# Patient Record
Sex: FEMALE | Race: WHITE | NOT HISPANIC OR LATINO
[De-identification: names, ages, dates, MRNs, and addresses within clinical notes are randomized per-mention and may not be internally consistent; named-entity substitution may affect disease eponyms.]

---

## 2019-07-23 PROBLEM — Z00.00 ENCOUNTER FOR PREVENTIVE HEALTH EXAMINATION: Status: ACTIVE | Noted: 2019-07-23

## 2019-07-24 ENCOUNTER — APPOINTMENT (OUTPATIENT)
Dept: OTOLARYNGOLOGY | Facility: CLINIC | Age: 64
End: 2019-07-24
Payer: COMMERCIAL

## 2019-07-24 VITALS
BODY MASS INDEX: 30.12 KG/M2 | HEART RATE: 101 BPM | HEIGHT: 63 IN | SYSTOLIC BLOOD PRESSURE: 119 MMHG | WEIGHT: 170 LBS | DIASTOLIC BLOOD PRESSURE: 83 MMHG

## 2019-07-24 DIAGNOSIS — Z83.3 FAMILY HISTORY OF DIABETES MELLITUS: ICD-10-CM

## 2019-07-24 DIAGNOSIS — Z78.9 OTHER SPECIFIED HEALTH STATUS: ICD-10-CM

## 2019-07-24 DIAGNOSIS — Z82.49 FAMILY HISTORY OF ISCHEMIC HEART DISEASE AND OTHER DISEASES OF THE CIRCULATORY SYSTEM: ICD-10-CM

## 2019-07-24 PROCEDURE — 99213 OFFICE O/P EST LOW 20 MIN: CPT | Mod: 25

## 2019-07-24 PROCEDURE — 31575 DIAGNOSTIC LARYNGOSCOPY: CPT

## 2019-07-24 NOTE — ASSESSMENT
[FreeTextEntry1] : Severe coughing fits for 6 weeks. During endoscopy she had such a fit and significant laryngeal sensitivity. While she was recovering it sounded as if she had wheezing and could not catch her breath. I strongly recommended pulmonary evaluation with Dr. Ferreira. Patient will call for an appointment. She also had evidence of reflux and I recommended dietary and behavioral modification and Zantac at night. I strongly recommended she not eat chocolate when she is symptomatic\par \par Snoring, possible obstructive sleep apnea. She will followup in one month and at that point we may consider home sleep study if her coughing symptoms have resolved

## 2019-07-24 NOTE — HISTORY OF PRESENT ILLNESS
[de-identified] : Patient presents with a 6 week history of dry cough and a globus sensation at the sternal notch. No preceding upper respiratory tract infections. She has a history of allergies to cats, but has not had recent exposure. Never saw a pulmonologist in the past. No known history of reflux. It improves when she takes a piece of chocolate which "coats the area ".  She reports a history of bronchitis on a yearly basis.\par \par Also reports many year history of snoring, no witnessed apneas, gasping awake, headaches, or daytime somnolence. He previously study was normal 20 years ago.

## 2019-08-21 ENCOUNTER — APPOINTMENT (OUTPATIENT)
Dept: OTOLARYNGOLOGY | Facility: CLINIC | Age: 64
End: 2019-08-21

## 2019-09-25 ENCOUNTER — APPOINTMENT (OUTPATIENT)
Dept: OTOLARYNGOLOGY | Facility: CLINIC | Age: 64
End: 2019-09-25
Payer: COMMERCIAL

## 2019-09-25 VITALS
BODY MASS INDEX: 30.12 KG/M2 | SYSTOLIC BLOOD PRESSURE: 121 MMHG | WEIGHT: 170 LBS | DIASTOLIC BLOOD PRESSURE: 78 MMHG | HEIGHT: 63 IN | HEART RATE: 75 BPM

## 2019-09-25 DIAGNOSIS — R06.83 SNORING: ICD-10-CM

## 2019-09-25 DIAGNOSIS — R05 COUGH: ICD-10-CM

## 2019-09-25 PROCEDURE — 99213 OFFICE O/P EST LOW 20 MIN: CPT

## 2019-09-25 NOTE — HISTORY OF PRESENT ILLNESS
[de-identified] : Patient seen July 24 with a 6 week history of dry cough and a globus sensation at the sternal notch. No preceding upper respiratory tract infections. She has a history of allergies to cats, but has not had recent exposure. Never saw a pulmonologist in the past. No known history of reflux. It improved when she ate a piece of chocolate which "coats the area ".  She reports a history of bronchitis on a yearly basis.  Had evidence of laryngopharyngeal reflux on endoscopy, and severe wheezing associated with a laryngeal sensitivity during scope I recommended pulmonary evaluation but she deferred. Recommended dietary modification and Zantac nightly. She reports with elimination of alcohol and tomatoes her coughing stopped.\par \par Also reports many year history of snoring, no witnessed apneas, gasping awake, headaches, or daytime somnolence. He previously study was normal 20 years ago.

## 2019-09-25 NOTE — ASSESSMENT
[FreeTextEntry1] : Severe coughing fits for 6 weeks, Resolved on reflux precautions.She is now off of Zantac. Recommended continued dietary management. \par \par Snoring, possible obstructive sleep apnea. She does not have significant symptoms of obstructive sleep apnea.   She wishes to defer testing.  Recommended Breathe Right strips for symptomatic relief, also discussed side positioning and head up\par \par Followup one year

## 2020-06-09 ENCOUNTER — APPOINTMENT (OUTPATIENT)
Dept: OTOLARYNGOLOGY | Facility: CLINIC | Age: 65
End: 2020-06-09
Payer: MEDICARE

## 2020-06-09 VITALS — BODY MASS INDEX: 30.12 KG/M2 | WEIGHT: 170 LBS | HEIGHT: 63 IN

## 2020-06-09 PROCEDURE — 92550 TYMPANOMETRY & REFLEX THRESH: CPT

## 2020-06-09 PROCEDURE — 99214 OFFICE O/P EST MOD 30 MIN: CPT | Mod: 25

## 2020-06-09 PROCEDURE — 92557 COMPREHENSIVE HEARING TEST: CPT

## 2020-06-09 PROCEDURE — 92504 EAR MICROSCOPY EXAMINATION: CPT

## 2020-06-09 NOTE — ASSESSMENT
[FreeTextEntry1] : Right ear pain is derived from the TMJ joint. I've discussed this with her including conservative measures. If symptoms persist, I have recommended an oral surgery consultation.\par \par Moderate exostosis bilaterally. Management options reviewed in detail.\par \par Bilateral sensorineural hearing loss reviewed with the patient in detail. Clinical monitoring advised.consider amplification.

## 2020-06-09 NOTE — HISTORY OF PRESENT ILLNESS
[de-identified] : DANISHA BACON has a history of right intermittent ear pain for about 6 months. Patient reports of no significant changes in hearing.  Prior history of bony growth in the right ear (exostosis). No otorrhea.  No percieved hearing loss.

## 2020-06-09 NOTE — PHYSICAL EXAM
[Normal] : external appearance is normal [Midline] : trachea located in midline position [de-identified] : tenderness on the right side greater than the left, moderate [FreeTextEntry1] : Procedure: Microscopic Ear Exam\par \par Left ear:  \par Exostosis causing partial ear canal stenosis, 60-70%. No inflammation or retained cerumen or keratin noted. The tympanic membrane appears within normal limits.\par \par \par Right ear:  \par Exostosis causing partial ear canal stenosis, 60-70%. No inflammation or retained cerumen or keratin noted. The tympanic membrane appears within normal limits.\par

## 2020-06-09 NOTE — DATA REVIEWED
[de-identified] : Complete audiometry was ordered and completed today. This was separately reported by the audiologist. The results were reviewed in detail with the patient.\par \par

## 2021-03-22 ENCOUNTER — TRANSCRIPTION ENCOUNTER (OUTPATIENT)
Age: 66
End: 2021-03-22

## 2021-05-25 ENCOUNTER — APPOINTMENT (OUTPATIENT)
Dept: SURGERY | Facility: CLINIC | Age: 66
End: 2021-05-25
Payer: MEDICARE

## 2021-05-25 PROCEDURE — 99203 OFFICE O/P NEW LOW 30 MIN: CPT

## 2021-06-03 ENCOUNTER — APPOINTMENT (OUTPATIENT)
Dept: CT IMAGING | Facility: CLINIC | Age: 66
End: 2021-06-03
Payer: MEDICARE

## 2021-06-03 ENCOUNTER — OUTPATIENT (OUTPATIENT)
Dept: OUTPATIENT SERVICES | Facility: HOSPITAL | Age: 66
LOS: 1 days | End: 2021-06-03

## 2021-06-03 PROCEDURE — 74177 CT ABD & PELVIS W/CONTRAST: CPT | Mod: 26,MH

## 2021-06-04 ENCOUNTER — TRANSCRIPTION ENCOUNTER (OUTPATIENT)
Age: 66
End: 2021-06-04

## 2021-06-15 ENCOUNTER — APPOINTMENT (OUTPATIENT)
Dept: SURGERY | Facility: CLINIC | Age: 66
End: 2021-06-15
Payer: MEDICARE

## 2021-06-15 PROCEDURE — 99215 OFFICE O/P EST HI 40 MIN: CPT

## 2021-06-25 ENCOUNTER — APPOINTMENT (OUTPATIENT)
Dept: MRI IMAGING | Facility: CLINIC | Age: 66
End: 2021-06-25
Payer: MEDICARE

## 2021-06-25 ENCOUNTER — RESULT REVIEW (OUTPATIENT)
Age: 66
End: 2021-06-25

## 2021-06-25 ENCOUNTER — OUTPATIENT (OUTPATIENT)
Dept: OUTPATIENT SERVICES | Facility: HOSPITAL | Age: 66
LOS: 1 days | End: 2021-06-25

## 2021-06-25 PROCEDURE — 74183 MRI ABD W/O CNTR FLWD CNTR: CPT | Mod: 26,MH

## 2021-07-14 ENCOUNTER — APPOINTMENT (OUTPATIENT)
Dept: GASTROENTEROLOGY | Facility: HOSPITAL | Age: 66
End: 2021-07-14

## 2021-07-14 ENCOUNTER — OUTPATIENT (OUTPATIENT)
Dept: OUTPATIENT SERVICES | Facility: HOSPITAL | Age: 66
LOS: 1 days | End: 2021-07-14
Payer: MEDICARE

## 2021-07-14 VITALS
WEIGHT: 169.98 LBS | HEIGHT: 62 IN | RESPIRATION RATE: 17 BRPM | DIASTOLIC BLOOD PRESSURE: 71 MMHG | TEMPERATURE: 98 F | OXYGEN SATURATION: 98 % | HEART RATE: 67 BPM | SYSTOLIC BLOOD PRESSURE: 128 MMHG

## 2021-07-14 VITALS
HEART RATE: 66 BPM | DIASTOLIC BLOOD PRESSURE: 74 MMHG | SYSTOLIC BLOOD PRESSURE: 126 MMHG | RESPIRATION RATE: 12 BRPM | OXYGEN SATURATION: 99 %

## 2021-07-14 DIAGNOSIS — S83.281A OTHER TEAR OF LATERAL MENISCUS, CURRENT INJURY, RIGHT KNEE, INITIAL ENCOUNTER: Chronic | ICD-10-CM

## 2021-07-14 DIAGNOSIS — M21.611 BUNION OF RIGHT FOOT: Chronic | ICD-10-CM

## 2021-07-14 DIAGNOSIS — Z98.84 BARIATRIC SURGERY STATUS: Chronic | ICD-10-CM

## 2021-07-14 DIAGNOSIS — Z90.89 ACQUIRED ABSENCE OF OTHER ORGANS: Chronic | ICD-10-CM

## 2021-07-14 DIAGNOSIS — K44.9 DIAPHRAGMATIC HERNIA WITHOUT OBSTRUCTION OR GANGRENE: ICD-10-CM

## 2021-07-14 DIAGNOSIS — S83.282A OTHER TEAR OF LATERAL MENISCUS, CURRENT INJURY, LEFT KNEE, INITIAL ENCOUNTER: Chronic | ICD-10-CM

## 2021-07-14 DIAGNOSIS — M77.8 OTHER ENTHESOPATHIES, NOT ELSEWHERE CLASSIFIED: Chronic | ICD-10-CM

## 2021-07-14 PROCEDURE — 74330 X-RAY BILE/PANC ENDOSCOPY: CPT

## 2021-07-14 PROCEDURE — C1889: CPT

## 2021-07-14 PROCEDURE — 43235 EGD DIAGNOSTIC BRUSH WASH: CPT

## 2021-07-14 PROCEDURE — C1769: CPT

## 2021-07-14 PROCEDURE — 43235 EGD DIAGNOSTIC BRUSH WASH: CPT | Mod: GC

## 2021-07-14 RX ORDER — SODIUM CHLORIDE 9 MG/ML
500 INJECTION INTRAMUSCULAR; INTRAVENOUS; SUBCUTANEOUS
Refills: 0 | Status: DISCONTINUED | OUTPATIENT
Start: 2021-07-14 | End: 2021-07-28

## 2021-07-14 NOTE — PRE PROCEDURE NOTE - PRE PROCEDURE EVALUATION
Attending Physician:   Dr. Abdirahman Byrd                          Procedure:  EGD     Indication for Procedure: s/p Mary-en-Y gastric bypass, GERD, evaluate for possible gastro-gastric fistula   ________________________________________________________  PAST MEDICAL & SURGICAL HISTORY:    ALLERGIES:  Allergy Status Unknown    HOME MEDICATIONS:    AICD/PPM: [ ] yes   [ ] no    PERTINENT LAB DATA:                      PHYSICAL EXAMINATION:    T(C): --  HR: --  BP: --  RR: --  SpO2: --    Constitutional: NAD  HEENT: PERRLA, EOMI,    Neck:  No JVD  Respiratory: CTAB/L  Cardiovascular: S1 and S2  Gastrointestinal: BS+, soft, NT/ND  Extremities: No peripheral edema  Neurological: A/O x 3, no focal deficits  Psychiatric: Normal mood, normal affect  Skin: No rashes    ASA Class: I [ ]  II [ ]  III [ ]  IV [ ]    COMMENTS:    The patient is a suitable candidate for the planned procedure unless box checked [ ]  No, explain:

## 2021-07-14 NOTE — ASU PATIENT PROFILE, ADULT - PSH
Acute lateral meniscus tear of left knee    Acute lateral meniscus tear of right knee    Bunion of right foot    H/O bariatric surgery  1998  History of tonsillectomy  60 yrs ago  Right elbow tendonitis  about 2000

## 2021-07-22 ENCOUNTER — APPOINTMENT (OUTPATIENT)
Dept: SURGERY | Facility: CLINIC | Age: 66
End: 2021-07-22
Payer: MEDICARE

## 2021-07-22 ENCOUNTER — APPOINTMENT (OUTPATIENT)
Dept: BARIATRICS | Facility: CLINIC | Age: 66
End: 2021-07-22
Payer: MEDICARE

## 2021-07-22 VITALS
TEMPERATURE: 97 F | BODY MASS INDEX: 31.25 KG/M2 | HEIGHT: 63 IN | WEIGHT: 176.37 LBS | SYSTOLIC BLOOD PRESSURE: 120 MMHG | DIASTOLIC BLOOD PRESSURE: 80 MMHG | HEART RATE: 76 BPM | OXYGEN SATURATION: 96 %

## 2021-07-22 VITALS
SYSTOLIC BLOOD PRESSURE: 119 MMHG | HEIGHT: 63 IN | WEIGHT: 170 LBS | DIASTOLIC BLOOD PRESSURE: 80 MMHG | OXYGEN SATURATION: 94 % | BODY MASS INDEX: 30.12 KG/M2 | HEART RATE: 76 BPM

## 2021-07-22 PROBLEM — K46.9 UNSPECIFIED ABDOMINAL HERNIA WITHOUT OBSTRUCTION OR GANGRENE: Chronic | Status: ACTIVE | Noted: 2021-07-14

## 2021-07-22 PROCEDURE — 99215 OFFICE O/P EST HI 40 MIN: CPT

## 2021-07-22 PROCEDURE — 99214 OFFICE O/P EST MOD 30 MIN: CPT

## 2021-07-22 PROCEDURE — 99204 OFFICE O/P NEW MOD 45 MIN: CPT

## 2021-07-22 RX ORDER — MULTIVITAMIN
TABLET ORAL
Refills: 0 | Status: ACTIVE | COMMUNITY

## 2021-07-22 RX ORDER — GLUC/MSM/COLGN2/HYAL/ANTIARTH3 375-375-20
TABLET ORAL
Refills: 0 | Status: ACTIVE | COMMUNITY

## 2021-07-23 NOTE — END OF VISIT
[Time Spent: ___ minutes] : I have spent [unfilled] minutes of time on the encounter.
Additional Notes: Consider patch testing if flares.
Detail Level: Simple

## 2021-08-12 NOTE — PHYSICAL EXAM
[Obese] : obese [Normal] : affect appropriate [de-identified] : EOMI, anicteric  [de-identified] : obese, soft, nontender. large nontender nonreducible hernia along midline incision

## 2021-08-12 NOTE — HISTORY OF PRESENT ILLNESS
[Procedure: ___] : Procedure performed: [unfilled]  [Date of Surgery: ___] : Date of Surgery:   [unfilled] [Surgeon Name:   ___] : Surgeon Name: Dr. TORRES [de-identified] : 67 yo F s/p gastric bypass in 1998 with Dr. Heredia was referred to office by Dr. Heredia to establish care with Bariatric surgeon. Went to see Dr. Heredia for evaluation of incisional hernia and was sent for abdominal CT that described incisional hernias and revealed large hiatal hernia with gastric pouch in chest cavity. Patient reports worsening acid reflux symptoms over the last year and currently has to constantly clear throat during the day and has nocturnal reflux with bile. Certain foods and alcohol makes it even worse. Sleeps with two pillows. Does not take medications for acid reflux symptoms. 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 Subsequent MRI to further access hepatic cyst found multiple benign hepatic cysts - largest one measuring over 13 cm. Had recent upper EGD with Dr. Byrd with clipping of gastrogastric fistula. Lastly, CT found sludge and air in the gallbladder. No complaints of biliary colic. With regard to incisional hernia, reports  increasingly prominent incisional hernia without any associated pain. Otherwise, patient reports mild weight regain of about 40 lbs over the years and would like to lose some weight. Has three meals a day and has a bag of chips a few times a week during the day. Drinks adequate water daily and small amount of liquid calories and artificial sweeteners in daily coffee. For exercise, walks frequently and does exercise video for 20 min daily. Takes vitamins daily. No nausea, vomiting, diarrhea or constipation. Reports have labs drawn recently and dexa scan within the last few years.

## 2021-08-12 NOTE — ASSESSMENT
[FreeTextEntry1] : 65 yo F s/p gastric bypass in 1998 with Dr. Heredia was referred to office by Dr. Heredia to establish care with Bariatric surgeon. Discussed in detail that she may need hiatal hernia repair and cholecystectomy. In addition, will need to establish plan about management of large hepatic cyst. Would like copy of recent lab results to determine if more labs are necessary to assess nutritional status and vitamin levels. \par \par Continue a low fat, low carbohydrate and high protein diet\par Continue current exercise routine\par Obtain lab results and may need to order additional labs\par Continue vitamins  \par Will discuss case in more detail with Dr. Heredia to determine next steps\par Follow up after discussion with Dr. Heredia\par Call with any questions or concerns\par \par Extensive Time was spent reviewing chart before and after visit (including radiologic studies).

## 2021-08-12 NOTE — REVIEW OF SYSTEMS
[Reflux/Heartburn] : reflux/heartburn [Hernia] : hernia [Negative] : Endocrine [Fever] : no fever [Chills] : no chills [Night Sweats] : no night sweats [Fatigue] : no fatigue [Recent Change In Weight] : ~T no recent weight change [Abdominal Pain] : no abdominal pain [Vomiting] : no vomiting [Constipation] : no constipation [Diarrhea] : no diarrhea

## 2021-08-17 ENCOUNTER — NON-APPOINTMENT (OUTPATIENT)
Age: 66
End: 2021-08-17

## 2021-08-31 ENCOUNTER — APPOINTMENT (OUTPATIENT)
Dept: SURGERY | Facility: CLINIC | Age: 66
End: 2021-08-31
Payer: MEDICARE

## 2021-08-31 VITALS
BODY MASS INDEX: 31.01 KG/M2 | OXYGEN SATURATION: 96 % | SYSTOLIC BLOOD PRESSURE: 136 MMHG | DIASTOLIC BLOOD PRESSURE: 82 MMHG | HEIGHT: 63 IN | WEIGHT: 175 LBS | HEART RATE: 78 BPM

## 2021-08-31 PROCEDURE — 99214 OFFICE O/P EST MOD 30 MIN: CPT

## 2021-08-31 RX ORDER — OMEPRAZOLE 20 MG/1
20 CAPSULE, DELAYED RELEASE ORAL DAILY
Qty: 30 | Refills: 2 | Status: ACTIVE | COMMUNITY
Start: 2021-08-17 | End: 1900-01-01

## 2021-09-17 ENCOUNTER — APPOINTMENT (OUTPATIENT)
Dept: RADIOLOGY | Facility: HOSPITAL | Age: 66
End: 2021-09-17
Payer: MEDICARE

## 2021-09-17 ENCOUNTER — RESULT REVIEW (OUTPATIENT)
Age: 66
End: 2021-09-17

## 2021-09-17 ENCOUNTER — OUTPATIENT (OUTPATIENT)
Dept: OUTPATIENT SERVICES | Facility: HOSPITAL | Age: 66
LOS: 1 days | End: 2021-09-17
Payer: MEDICARE

## 2021-09-17 DIAGNOSIS — M21.611 BUNION OF RIGHT FOOT: Chronic | ICD-10-CM

## 2021-09-17 DIAGNOSIS — S83.282A OTHER TEAR OF LATERAL MENISCUS, CURRENT INJURY, LEFT KNEE, INITIAL ENCOUNTER: Chronic | ICD-10-CM

## 2021-09-17 DIAGNOSIS — S83.281A OTHER TEAR OF LATERAL MENISCUS, CURRENT INJURY, RIGHT KNEE, INITIAL ENCOUNTER: Chronic | ICD-10-CM

## 2021-09-17 DIAGNOSIS — Z90.89 ACQUIRED ABSENCE OF OTHER ORGANS: Chronic | ICD-10-CM

## 2021-09-17 DIAGNOSIS — Z98.84 BARIATRIC SURGERY STATUS: Chronic | ICD-10-CM

## 2021-09-17 DIAGNOSIS — Z00.00 ENCOUNTER FOR GENERAL ADULT MEDICAL EXAMINATION WITHOUT ABNORMAL FINDINGS: ICD-10-CM

## 2021-09-17 DIAGNOSIS — M77.8 OTHER ENTHESOPATHIES, NOT ELSEWHERE CLASSIFIED: Chronic | ICD-10-CM

## 2021-09-17 PROCEDURE — 74240 X-RAY XM UPR GI TRC 1CNTRST: CPT

## 2021-09-17 PROCEDURE — 74240 X-RAY XM UPR GI TRC 1CNTRST: CPT | Mod: 26

## 2021-09-24 DIAGNOSIS — K31.6 FISTULA OF STOMACH AND DUODENUM: ICD-10-CM

## 2021-09-30 ENCOUNTER — APPOINTMENT (OUTPATIENT)
Dept: BARIATRICS | Facility: CLINIC | Age: 66
End: 2021-09-30
Payer: MEDICARE

## 2021-09-30 VITALS
DIASTOLIC BLOOD PRESSURE: 80 MMHG | OXYGEN SATURATION: 97 % | HEART RATE: 67 BPM | WEIGHT: 174.38 LBS | HEIGHT: 63 IN | BODY MASS INDEX: 30.9 KG/M2 | TEMPERATURE: 97.2 F | SYSTOLIC BLOOD PRESSURE: 120 MMHG

## 2021-09-30 LAB
BASOPHILS # BLD AUTO: 0.03 K/UL
BASOPHILS NFR BLD AUTO: 0.6 %
EOSINOPHIL # BLD AUTO: 0.1 K/UL
EOSINOPHIL NFR BLD AUTO: 2 %
HCT VFR BLD CALC: 38.6 %
HGB BLD-MCNC: 12.1 G/DL
IMM GRANULOCYTES NFR BLD AUTO: 0.8 %
LYMPHOCYTES # BLD AUTO: 1.4 K/UL
LYMPHOCYTES NFR BLD AUTO: 27.5 %
MAN DIFF?: NORMAL
MCHC RBC-ENTMCNC: 26.8 PG
MCHC RBC-ENTMCNC: 31.3 GM/DL
MCV RBC AUTO: 85.6 FL
MONOCYTES # BLD AUTO: 0.43 K/UL
MONOCYTES NFR BLD AUTO: 8.4 %
NEUTROPHILS # BLD AUTO: 3.09 K/UL
NEUTROPHILS NFR BLD AUTO: 60.7 %
PLATELET # BLD AUTO: 215 K/UL
RBC # BLD: 4.51 M/UL
RBC # FLD: 14.3 %
WBC # FLD AUTO: 5.09 K/UL

## 2021-09-30 PROCEDURE — 99214 OFFICE O/P EST MOD 30 MIN: CPT

## 2021-09-30 NOTE — ASSESSMENT
[FreeTextEntry1] : 67 yo F s/p open gastric bypass in 1998 with Dr. Heredia, patient now with acid reflux, symptomatic cholelithiasis, incisional hernia as well as incidental larger liver cyst.  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  Discussed surgical repair of hiatal hernia and cholecystectomy.  Surggery would also include upper EGD to evaluate closure of fistula and if not closed would neeed to be addressed at the time of surgery. Patient has travel plans for November, will consider having surgery in December. Will coordinate with Dr. Heredia. \par \par Received and reviewed Dexa Scan reports.\par \par Continue a low fat, low carbohydrate and high protein diet\par Continue current exercise routine\par LABS\par Nutrition evaluation\par Continue vitamins \par Take omeprazole daily and add Famotidine at night\par Follow up beginning of Nov\par Call with any questions or concerns\par \par Additional Time was spent reviewing chart before and after visit.

## 2021-09-30 NOTE — PHYSICAL EXAM
[Obese] : obese [Normal] : affect appropriate [de-identified] : EOMI, anicteric  [de-identified] : obese, soft, nontender. large nontender nonreducible hernia along midline incision

## 2021-09-30 NOTE — HISTORY OF PRESENT ILLNESS
[Procedure: ___] : Procedure performed: [unfilled]  [Date of Surgery: ___] : Date of Surgery:   [unfilled] [Surgeon Name:   ___] : Surgeon Name: Dr. TORRES [Pre-Op Weight ___] : Pre-op weight was [unfilled] lbs [de-identified] : 65 yo F s/p open gastric bypass in 1998 with Dr. Heredia. Patient has acid reflux symptoms. Patient with significant hiatal hernia with gastric pouch in chest. Patient also has hx of gastrogastric fistula which was closed by GI most recent UGI showed no G-G fistula. Acid reflux is worse at night despite omeprazole in the AM. However, some days misses omeprazole and has acid reflux during the day. Has three protein rich meals a day and drinks adequate water daily and small amount of liquid calories and artificial sweeteners in daily coffee. For exercise, walks frequently and does exercise video for 20 min daily. Takes vitamins daily. No nausea, vomiting, diarrhea or constipation. \par \par Patient also has symptomatic cholelithiasis, large liver cyst and incisional hernia which need to be addressed. Patient is here today to discuss surgery and timing of surgery.  patient has several trips scheduled out of the country in the next two months.

## 2021-10-19 LAB
25(OH)D3 SERPL-MCNC: 45.8 NG/ML
A-TOCOPHEROL VIT E SERPL-MCNC: 11.6 MG/L
ALBUMIN SERPL ELPH-MCNC: 4.3 G/DL
ALP BLD-CCNC: 86 U/L
ALT SERPL-CCNC: 19 U/L
ANION GAP SERPL CALC-SCNC: 12 MMOL/L
AST SERPL-CCNC: 18 U/L
BETA+GAMMA TOCOPHEROL SERPL-MCNC: 2.3 MG/L
BILIRUB SERPL-MCNC: 0.2 MG/DL
BUN SERPL-MCNC: 10 MG/DL
CALCIUM SERPL-MCNC: 9.5 MG/DL
CHLORIDE SERPL-SCNC: 103 MMOL/L
CO2 SERPL-SCNC: 27 MMOL/L
CREAT SERPL-MCNC: 0.5 MG/DL
ESTIMATED AVERAGE GLUCOSE: 134 MG/DL
FERRITIN SERPL-MCNC: 31 NG/ML
FOLATE SERPL-MCNC: 19.7 NG/ML
GLUCOSE SERPL-MCNC: 113 MG/DL
HBA1C MFR BLD HPLC: 6.3 %
IRON SATN MFR SERPL: 15 %
IRON SERPL-MCNC: 52 UG/DL
MENADIONE SERPL-MCNC: 1.01 NG/ML
POTASSIUM SERPL-SCNC: 4 MMOL/L
PREALB SERPL NEPH-MCNC: 18 MG/DL
PROT SERPL-MCNC: 6 G/DL
SELENIUM SERPL-MCNC: 105 UG/L
SODIUM SERPL-SCNC: 142 MMOL/L
T4 FREE SERPL-MCNC: 0.9 NG/DL
TIBC SERPL-MCNC: 348 UG/DL
UIBC SERPL-MCNC: 295 UG/DL
VIT A SERPL-MCNC: 32.9 UG/DL
VIT B1 SERPL-MCNC: 151 NMOL/L
VIT B12 SERPL-MCNC: 801 PG/ML
VIT B2 SERPL-MCNC: 218 UG/L
VIT B6 SERPL-MCNC: 6.1 UG/L

## 2021-10-27 ENCOUNTER — APPOINTMENT (OUTPATIENT)
Dept: BARIATRICS | Facility: CLINIC | Age: 66
End: 2021-10-27
Payer: MEDICARE

## 2021-10-27 VITALS
OXYGEN SATURATION: 96 % | DIASTOLIC BLOOD PRESSURE: 70 MMHG | HEIGHT: 63 IN | WEIGHT: 176.81 LBS | BODY MASS INDEX: 31.33 KG/M2 | HEART RATE: 74 BPM | TEMPERATURE: 97.2 F | SYSTOLIC BLOOD PRESSURE: 112 MMHG

## 2021-10-27 DIAGNOSIS — K82.8 OTHER SPECIFIED DISEASES OF GALLBLADDER: ICD-10-CM

## 2021-10-27 DIAGNOSIS — Z86.018 PERSONAL HISTORY OF OTHER BENIGN NEOPLASM: ICD-10-CM

## 2021-10-27 DIAGNOSIS — K76.89 OTHER SPECIFIED DISEASES OF LIVER: ICD-10-CM

## 2021-10-27 DIAGNOSIS — Z86.73 PERSONAL HISTORY OF TRANSIENT ISCHEMIC ATTACK (TIA), AND CEREBRAL INFARCTION W/OUT RESIDUAL DEFICITS: ICD-10-CM

## 2021-10-27 PROCEDURE — 99214 OFFICE O/P EST MOD 30 MIN: CPT

## 2021-10-27 NOTE — REASON FOR VISIT
[Follow-Up Visit] : a follow-up visit for [S/P Bariatric Surgery] : s/p bariatric surgery [Spouse] : spouse

## 2021-10-28 ENCOUNTER — APPOINTMENT (OUTPATIENT)
Dept: RADIOLOGY | Facility: HOSPITAL | Age: 66
End: 2021-10-28

## 2021-11-03 ENCOUNTER — APPOINTMENT (OUTPATIENT)
Dept: BARIATRICS/WEIGHT MGMT | Facility: CLINIC | Age: 66
End: 2021-11-03
Payer: MEDICARE

## 2021-11-03 DIAGNOSIS — R73.03 PREDIABETES.: ICD-10-CM

## 2021-11-03 PROCEDURE — 97802 MEDICAL NUTRITION INDIV IN: CPT | Mod: 95

## 2021-11-08 PROBLEM — R73.03 PREDIABETES: Status: ACTIVE | Noted: 2021-09-22

## 2021-11-15 ENCOUNTER — OUTPATIENT (OUTPATIENT)
Dept: OUTPATIENT SERVICES | Facility: HOSPITAL | Age: 66
LOS: 1 days | End: 2021-11-15
Payer: MEDICARE

## 2021-11-15 VITALS
HEIGHT: 63 IN | DIASTOLIC BLOOD PRESSURE: 84 MMHG | RESPIRATION RATE: 16 BRPM | WEIGHT: 177.03 LBS | TEMPERATURE: 98 F | HEART RATE: 78 BPM | SYSTOLIC BLOOD PRESSURE: 138 MMHG

## 2021-11-15 DIAGNOSIS — Z98.84 BARIATRIC SURGERY STATUS: Chronic | ICD-10-CM

## 2021-11-15 DIAGNOSIS — M21.611 BUNION OF RIGHT FOOT: Chronic | ICD-10-CM

## 2021-11-15 DIAGNOSIS — Z90.89 ACQUIRED ABSENCE OF OTHER ORGANS: Chronic | ICD-10-CM

## 2021-11-15 DIAGNOSIS — S83.281A OTHER TEAR OF LATERAL MENISCUS, CURRENT INJURY, RIGHT KNEE, INITIAL ENCOUNTER: Chronic | ICD-10-CM

## 2021-11-15 DIAGNOSIS — Z98.890 OTHER SPECIFIED POSTPROCEDURAL STATES: Chronic | ICD-10-CM

## 2021-11-15 DIAGNOSIS — K80.20 CALCULUS OF GALLBLADDER WITHOUT CHOLECYSTITIS WITHOUT OBSTRUCTION: ICD-10-CM

## 2021-11-15 DIAGNOSIS — M77.8 OTHER ENTHESOPATHIES, NOT ELSEWHERE CLASSIFIED: Chronic | ICD-10-CM

## 2021-11-15 DIAGNOSIS — Z01.818 ENCOUNTER FOR OTHER PREPROCEDURAL EXAMINATION: ICD-10-CM

## 2021-11-15 DIAGNOSIS — Z98.84 BARIATRIC SURGERY STATUS: ICD-10-CM

## 2021-11-15 DIAGNOSIS — S83.282A OTHER TEAR OF LATERAL MENISCUS, CURRENT INJURY, LEFT KNEE, INITIAL ENCOUNTER: Chronic | ICD-10-CM

## 2021-11-15 DIAGNOSIS — K44.9 DIAPHRAGMATIC HERNIA WITHOUT OBSTRUCTION OR GANGRENE: ICD-10-CM

## 2021-11-15 DIAGNOSIS — K21.9 GASTRO-ESOPHAGEAL REFLUX DISEASE WITHOUT ESOPHAGITIS: ICD-10-CM

## 2021-11-15 LAB
ALBUMIN SERPL ELPH-MCNC: 3.6 G/DL — SIGNIFICANT CHANGE UP (ref 3.3–5)
ALP SERPL-CCNC: 94 U/L — SIGNIFICANT CHANGE UP (ref 30–120)
ALT FLD-CCNC: 29 U/L DA — SIGNIFICANT CHANGE UP (ref 10–60)
ANION GAP SERPL CALC-SCNC: 7 MMOL/L — SIGNIFICANT CHANGE UP (ref 5–17)
AST SERPL-CCNC: 14 U/L — SIGNIFICANT CHANGE UP (ref 10–40)
BILIRUB SERPL-MCNC: 0.3 MG/DL — SIGNIFICANT CHANGE UP (ref 0.2–1.2)
BLD GP AB SCN SERPL QL: SIGNIFICANT CHANGE UP
BUN SERPL-MCNC: 16 MG/DL — SIGNIFICANT CHANGE UP (ref 7–23)
CALCIUM SERPL-MCNC: 9.5 MG/DL — SIGNIFICANT CHANGE UP (ref 8.4–10.5)
CHLORIDE SERPL-SCNC: 105 MMOL/L — SIGNIFICANT CHANGE UP (ref 96–108)
CO2 SERPL-SCNC: 31 MMOL/L — SIGNIFICANT CHANGE UP (ref 22–31)
CREAT SERPL-MCNC: 0.49 MG/DL — LOW (ref 0.5–1.3)
GLUCOSE SERPL-MCNC: 108 MG/DL — HIGH (ref 70–99)
HCT VFR BLD CALC: 38.8 % — SIGNIFICANT CHANGE UP (ref 34.5–45)
HGB BLD-MCNC: 12.2 G/DL — SIGNIFICANT CHANGE UP (ref 11.5–15.5)
MCHC RBC-ENTMCNC: 26.8 PG — LOW (ref 27–34)
MCHC RBC-ENTMCNC: 31.4 GM/DL — LOW (ref 32–36)
MCV RBC AUTO: 85.1 FL — SIGNIFICANT CHANGE UP (ref 80–100)
NRBC # BLD: 0 /100 WBCS — SIGNIFICANT CHANGE UP (ref 0–0)
PLATELET # BLD AUTO: 204 K/UL — SIGNIFICANT CHANGE UP (ref 150–400)
POTASSIUM SERPL-MCNC: 4.2 MMOL/L — SIGNIFICANT CHANGE UP (ref 3.5–5.3)
POTASSIUM SERPL-SCNC: 4.2 MMOL/L — SIGNIFICANT CHANGE UP (ref 3.5–5.3)
PROT SERPL-MCNC: 6.8 G/DL — SIGNIFICANT CHANGE UP (ref 6–8.3)
RBC # BLD: 4.56 M/UL — SIGNIFICANT CHANGE UP (ref 3.8–5.2)
RBC # FLD: 13.8 % — SIGNIFICANT CHANGE UP (ref 10.3–14.5)
SODIUM SERPL-SCNC: 143 MMOL/L — SIGNIFICANT CHANGE UP (ref 135–145)
WBC # BLD: 5.6 K/UL — SIGNIFICANT CHANGE UP (ref 3.8–10.5)
WBC # FLD AUTO: 5.6 K/UL — SIGNIFICANT CHANGE UP (ref 3.8–10.5)

## 2021-11-15 PROCEDURE — 93010 ELECTROCARDIOGRAM REPORT: CPT

## 2021-11-15 PROCEDURE — 93005 ELECTROCARDIOGRAM TRACING: CPT

## 2021-11-15 PROCEDURE — 71046 X-RAY EXAM CHEST 2 VIEWS: CPT | Mod: 26

## 2021-11-15 PROCEDURE — 71046 X-RAY EXAM CHEST 2 VIEWS: CPT

## 2021-11-15 PROCEDURE — G0463: CPT

## 2021-11-15 NOTE — H&P PST ADULT - HISTORY OF PRESENT ILLNESS
65 y/o female  67 y/o female with hx of Rouxen -Y-gastric bypass done in 1998 with hiatal hernia and acid reflux symptoms. Diagnostic studies revealed cholelithiasis and no relief with symptoms with medicine regime and diet modifications. Was advised surgery and for pre op today in NAD

## 2021-11-15 NOTE — H&P PST ADULT - NSICDXFAMILYHX_GEN_ALL_CORE_FT
FAMILY HISTORY:  Mother  Still living? No  FH: heart disease, Age at diagnosis: Age Unknown    Sibling  Still living? Yes, Estimated age: Age Unknown  Family history of breast cancer in sister, Age at diagnosis: Age Unknown

## 2021-11-15 NOTE — H&P PST ADULT - NSICDXPASTMEDICALHX_GEN_ALL_CORE_FT
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

## 2021-11-15 NOTE — H&P PST ADULT - NSICDXPASTSURGICALHX_GEN_ALL_CORE_FT
PAST SURGICAL HISTORY:  Acute lateral meniscus tear of left knee     Acute lateral meniscus tear of right knee     Bunion of right foot     H/O bariatric surgery 1998-intestinal bypass    History of eye surgery Bilateral 2008-vision correction PRK    History of tonsillectomy 60 yrs ago    Right elbow tendonitis about 1992     PAST SURGICAL HISTORY:  Acute lateral meniscus tear of left knee     Acute lateral meniscus tear of right knee     Bunion of right foot     H/O bariatric surgery 1998-intestinal bypass Mary-en- Y gastric bypass    History of eye surgery Bilateral 2008-vision correction PRK    History of tonsillectomy 60 yrs ago    Right elbow tendonitis about 1992

## 2021-11-15 NOTE — H&P PST ADULT - ASSESSMENT
67 y/o female with hiatal hernia/ Gall bladder sludge/Hepatic cyst     Planned surgery- Laparoscopic repair of hiatal hernia and related procedures  Will obtain medical clearance  covid testing-Leslie brito-instructions provided  - Patient concerned about blood thinners post op -Office aware  Pre op instructions provided  Instructions provided on medications to continue and to take the day morning of surgery       65 y/o female with hiatal hernia/ Gall bladder sludge/Hepatic cyst     Planned surgery- Laparoscopic repair of hiatal hernia and related procedures  Will obtain medical clearance  covid testing-Leslie brito-instructions provided  - Patient concerned about blood thinners post op -Office aware  Pre op instructions provided  Instructions provided on medications to continue and to take the day morning of surgery  Pt. seen and examined in pre op area. Pt. had normal esophageal studies, which were discussed with Dr. BURTON Morillo and the pt.

## 2021-11-16 ENCOUNTER — APPOINTMENT (OUTPATIENT)
Dept: SURGERY | Facility: CLINIC | Age: 66
End: 2021-11-16
Payer: MEDICARE

## 2021-11-16 ENCOUNTER — NON-APPOINTMENT (OUTPATIENT)
Age: 66
End: 2021-11-16

## 2021-11-16 PROBLEM — I63.9 CEREBRAL INFARCTION, UNSPECIFIED: Chronic | Status: ACTIVE | Noted: 2021-11-15

## 2021-11-16 PROBLEM — Z86.2 PERSONAL HISTORY OF DISEASES OF THE BLOOD AND BLOOD-FORMING ORGANS AND CERTAIN DISORDERS INVOLVING THE IMMUNE MECHANISM: Chronic | Status: ACTIVE | Noted: 2021-11-15

## 2021-11-16 PROBLEM — K76.89 OTHER SPECIFIED DISEASES OF LIVER: Chronic | Status: ACTIVE | Noted: 2021-11-15

## 2021-11-16 PROBLEM — D18.00 HEMANGIOMA UNSPECIFIED SITE: Chronic | Status: ACTIVE | Noted: 2021-07-14

## 2021-11-16 PROBLEM — K21.9 GASTRO-ESOPHAGEAL REFLUX DISEASE WITHOUT ESOPHAGITIS: Chronic | Status: ACTIVE | Noted: 2021-11-15

## 2021-11-16 PROBLEM — K80.20 CALCULUS OF GALLBLADDER WITHOUT CHOLECYSTITIS WITHOUT OBSTRUCTION: Chronic | Status: ACTIVE | Noted: 2021-11-15

## 2021-11-16 PROBLEM — K82.8 OTHER SPECIFIED DISEASES OF GALLBLADDER: Chronic | Status: ACTIVE | Noted: 2021-11-15

## 2021-11-16 PROCEDURE — 99448 NTRPROF PH1/NTRNET/EHR 21-30: CPT

## 2021-11-16 NOTE — ASU DISCHARGE PLAN (ADULT/PEDIATRIC) - ASU DC SPECIAL INSTRUCTIONSFT
REST! Apply ice packs to incision sites 20 mins on, 20 mins off every 4 hours for the first 24 hours.    If taking narcotic pain medications, may take COLACE (OTC stool softener) as directed to prevent constipation.    Increase ambulation and utilize incentive spirometer as directed.    Please call Dr. PABLO Moore's office ([phone]) to schedule a follow-up appointment to be seen in 1 week.

## 2021-11-16 NOTE — ASU DISCHARGE PLAN (ADULT/PEDIATRIC) - NS MD DC FALL RISK RISK
For information on Fall & Injury Prevention, visit: [email or web address] OR  [email or web address] OR  https://www.cdc.gov/steadi/patient.html

## 2021-11-16 NOTE — ASU DISCHARGE PLAN (ADULT/PEDIATRIC) - CARE PROVIDER_API CALL
Laxmi Moore (MD)  Surgery  [address] [place] [address]  Phone: [phone]  Fax: [phone]  Follow Up Time:

## 2021-11-17 NOTE — REVIEW OF SYSTEMS
[Reflux/Heartburn] : reflux/heartburn [Hernia] : hernia [Negative] : Endocrine [Recent Change In Weight] : ~T recent weight change [Fever] : no fever [Chills] : no chills [Night Sweats] : no night sweats [Fatigue] : no fatigue [Abdominal Pain] : no abdominal pain [Vomiting] : no vomiting [Constipation] : no constipation [Diarrhea] : no diarrhea

## 2021-11-17 NOTE — ASSESSMENT
[FreeTextEntry1] : 67 yo F s/p open gastric bypass in 1998 with Dr. Heredia. Now with acid reflux, asymptomatic cholelithiasis, incisional hernia as well as incidental larger liver cyst.  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 Surgery would also include upper EGD to evaluate closure of fistula and if not closed would need to be addressed at the time of surgery.  \par \par I had an extensive discussion with the patient reviewing the surgical repair of hiatal hernia, possible cholecystectomy and upper EGD to evaluate closure of fistula. Diagrams were used. All questions were answered. \par \par Complications were discussed including but not limited to: pneumonia, urinary infection, wound infection, leaks/peritonitis possibly requiring intraabdominal drains or reoperation, bleeding, DVT, pulmonary embolus, no resolution of reflux, additional abdominal wall hernias, internal hernia and death. \par \par Upon discussion of preventive measures taken to reduce DVT and PE, patient reports that neurologist does not want patient to be put on "blood thinners". Has hx of stroke secondary to cavernous angioma. Will need to discuss further with neurologist directly.\par \par Continue a low fat, low carbohydrate and high protein diet\par Continue current exercise routine\par Food journal an nutrition evaluation\par Continue vitamins \par Take omeprazole daily and continue Famotidine at night\par Follow up with neurologist, RAE Espinosa at [place] about perioperative anticoagulation. \par Call with any questions or concerns\par \par Additional Time was spent reviewing chart before and after visit.

## 2021-11-17 NOTE — PHYSICAL EXAM
[Obese] : obese [Normal] : affect appropriate [de-identified] : obese, soft, nontender. large nontender nonreducible hernia along midline incision [de-identified] : EOMI, anicteric

## 2021-11-17 NOTE — HISTORY OF PRESENT ILLNESS
[Procedure: ___] : Procedure performed: [unfilled]  [Date of Surgery: ___] : Date of Surgery:   [unfilled] [Surgeon Name:   ___] : Surgeon Name: Dr. TORRES [Pre-Op Weight ___] : Pre-op weight was [unfilled] lbs [de-identified] : 67 yo F s/p open gastric bypass in 1998 with Dr. Heredia here to discuss possible surgical interventions.  Patient with significant hiatal hernia with gastric pouch in chest. Has acid reflux symptoms including nocturnal reflux despite current medication regimen. Recently started to randomly dry retch - unable to identify trigger. Patient also has hx of gastrogastric fistula which was closed by GI - most recent UGI showed no G-G fistula. Was found to have cholelithiasis - no current symptoms of biliary colic. Has three protein rich meals a day and drinks adequate water daily and small amount of liquid calories and artificial sweeteners in daily coffee. For exercise, walks frequently and does exercise video for 20 min daily. Takes vitamins daily. No diarrhea or constipation. Recent labs revealed low prealbumin and HgbA1c 6.3%. \par

## 2021-11-19 DIAGNOSIS — Z01.818 ENCOUNTER FOR OTHER PREPROCEDURAL EXAMINATION: ICD-10-CM

## 2021-11-22 ENCOUNTER — APPOINTMENT (OUTPATIENT)
Dept: GASTROENTEROLOGY | Facility: CLINIC | Age: 66
End: 2021-11-22
Payer: MEDICARE

## 2021-11-22 ENCOUNTER — NON-APPOINTMENT (OUTPATIENT)
Age: 66
End: 2021-11-22

## 2021-11-22 DIAGNOSIS — K31.6 FISTULA OF STOMACH AND DUODENUM: ICD-10-CM

## 2021-11-22 DIAGNOSIS — Z98.84 BARIATRIC SURGERY STATUS: ICD-10-CM

## 2021-11-22 PROCEDURE — 99442: CPT | Mod: 95

## 2021-11-24 PROBLEM — Z98.84 HISTORY OF ROUX-EN-Y GASTRIC BYPASS: Status: ACTIVE | Noted: 2021-07-23

## 2021-11-24 PROBLEM — K31.6 GASTROGASTRIC FISTULA: Status: ACTIVE | Noted: 2021-07-23

## 2021-12-01 ENCOUNTER — APPOINTMENT (OUTPATIENT)
Dept: GASTROENTEROLOGY | Facility: HOSPITAL | Age: 66
End: 2021-12-01

## 2021-12-01 ENCOUNTER — OUTPATIENT (OUTPATIENT)
Dept: OUTPATIENT SERVICES | Facility: HOSPITAL | Age: 66
LOS: 1 days | End: 2021-12-01
Payer: MEDICARE

## 2021-12-01 VITALS
TEMPERATURE: 97 F | HEIGHT: 63 IN | WEIGHT: 175.93 LBS | DIASTOLIC BLOOD PRESSURE: 90 MMHG | SYSTOLIC BLOOD PRESSURE: 139 MMHG | OXYGEN SATURATION: 95 % | HEART RATE: 69 BPM | RESPIRATION RATE: 14 BRPM

## 2021-12-01 DIAGNOSIS — Z98.84 BARIATRIC SURGERY STATUS: ICD-10-CM

## 2021-12-01 DIAGNOSIS — M77.8 OTHER ENTHESOPATHIES, NOT ELSEWHERE CLASSIFIED: Chronic | ICD-10-CM

## 2021-12-01 DIAGNOSIS — Z90.89 ACQUIRED ABSENCE OF OTHER ORGANS: Chronic | ICD-10-CM

## 2021-12-01 DIAGNOSIS — M21.611 BUNION OF RIGHT FOOT: Chronic | ICD-10-CM

## 2021-12-01 DIAGNOSIS — K21.9 GASTRO-ESOPHAGEAL REFLUX DISEASE WITHOUT ESOPHAGITIS: ICD-10-CM

## 2021-12-01 DIAGNOSIS — K31.6 FISTULA OF STOMACH AND DUODENUM: ICD-10-CM

## 2021-12-01 DIAGNOSIS — S83.281A OTHER TEAR OF LATERAL MENISCUS, CURRENT INJURY, RIGHT KNEE, INITIAL ENCOUNTER: Chronic | ICD-10-CM

## 2021-12-01 DIAGNOSIS — S83.282A OTHER TEAR OF LATERAL MENISCUS, CURRENT INJURY, LEFT KNEE, INITIAL ENCOUNTER: Chronic | ICD-10-CM

## 2021-12-01 DIAGNOSIS — Z98.890 OTHER SPECIFIED POSTPROCEDURAL STATES: Chronic | ICD-10-CM

## 2021-12-01 DIAGNOSIS — Z98.84 BARIATRIC SURGERY STATUS: Chronic | ICD-10-CM

## 2021-12-01 PROCEDURE — 91010 ESOPHAGUS MOTILITY STUDY: CPT

## 2021-12-01 PROCEDURE — 91010 ESOPHAGUS MOTILITY STUDY: CPT | Mod: 26

## 2021-12-01 NOTE — PRE PROCEDURE NOTE - PRE PROCEDURE EVALUATION
Attending Physician:              Austin Morillo MD MSEd  Performing Provider:            SHYAM RubyC  Indication for Procedure:  Pre-op h/o Mary-n Y Bypass, h/o gastro gastric Fistula             PAST MEDICAL & SURGICAL HISTORY:  Angioma  brain stem-2005    Hernia    Hiatal hernia with GERD    Gall bladder stones    Hepatic cyst    Cerebrovascular accident (CVA)  medical records- Cavernous angioma    Gallbladder sludge    History of anemia    H/O bariatric surgery  1998-intestinal bypass Mary-en- Y gastric bypass    History of tonsillectomy  60 yrs ago    Acute lateral meniscus tear of left knee    Acute lateral meniscus tear of right knee    Bunion of right foot    Right elbow tendonitis  about 1992    History of eye surgery  Bilateral 2008-vision correction PRK          See Allscripts Note for further details  ALLERGIES:  No Known Allergies    HOME MEDICATIONS:  Calcium 500+D oral tablet, chewable: 1 tab(s) orally 2 times a day  Iron 100 Plus oral tablet: 1 tab(s) orally once a day  Multi Vitamin+:   omeprazole 20 mg oral delayed release tablet: 1 tab(s) orally once a day  Vitamin B-12 250 mcg oral tablet: 1 tab(s) orally once a day  Vitamin D3 250 mcg (10,000 intl units) oral capsule: 1 cap(s) orally once a week      See Allscripts Note for further details    AICD/PPM: [ ] yes   [X ] no    PERTINENT LAB DATA:                      PHYSICAL EXAMINATION:    Height (cm): 160  Weight (kg): 79.8  BMI (kg/m2): 31.2  BSA (m2): 1.83T(C): 36.3  HR: 69  BP: 139/90  RR: 14  SpO2: 95%    Constitutional: NAD  HEENT: PERRLA, EOMI,    Neck:  No JVD  Respiratory: CTAB/L  Cardiovascular: S1 and S2  Gastrointestinal: BS+, soft, NT/ND  Extremities: No peripheral edema  Neurological: A/O x 3, no focal deficits  Psychiatric: Normal mood, normal affect  Skin: No rashes    ASA Class: I [ ]  II [ X]  III [ ]  IV [ ]    COMMENTS:    The patient is a suitable candidate for the planned procedure unless box checked [ ]  No, explain:

## 2021-12-01 NOTE — ASU PATIENT PROFILE, ADULT - NSICDXPASTMEDICALHX_GEN_ALL_CORE_FT
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

## 2021-12-01 NOTE — ASU PATIENT PROFILE, ADULT - NSICDXPASTSURGICALHX_GEN_ALL_CORE_FT
PAST SURGICAL HISTORY:  Acute lateral meniscus tear of left knee     Acute lateral meniscus tear of right knee     Bunion of right foot     H/O bariatric surgery 1998-intestinal bypass Mary-en- Y gastric bypass    History of eye surgery Bilateral 2008-vision correction PRK    History of tonsillectomy 60 yrs ago    Right elbow tendonitis about 1992

## 2021-12-01 NOTE — ASU DISCHARGE PLAN (ADULT/PEDIATRIC) - SIGNS AND SYMPTOMS OF INFECTION: FEVER, REDNESS, SWELLING, FOUL SMELLING DISCHARGE
Date & Time: 5/19/2022, 2:12 PM  Patient: Jostin Aleman  Encounter Provider(s):    Barrington Jacobs MD       To Whom It May Concern:    Jostin Aleman was seen and treated in our department on 5/19/2022. She should not return to work until 5/21/2022.     If you have any questions or concerns, please do not hesitate to call.        _____________________________  Physician/APC Signature Statement Selected

## 2021-12-01 NOTE — ASU PATIENT PROFILE, ADULT - FALL HARM RISK - RISK INTERVENTIONS
Sit up slowly, dangle for a short time, stand at bedside before walking/Physically safe environment - no spills, clutter or unnecessary equipment

## 2021-12-01 NOTE — ASU DISCHARGE PLAN (ADULT/PEDIATRIC) - NS MD DC FALL RISK RISK
For information on Fall & Injury Prevention, visit: [email or web address] OR  [email or web address] OR  https://www.cdc.gov/steadi/patient.html

## 2021-12-03 ENCOUNTER — APPOINTMENT (OUTPATIENT)
Dept: DISASTER EMERGENCY | Facility: CLINIC | Age: 66
End: 2021-12-03

## 2021-12-04 LAB — SARS-COV-2 N GENE NPH QL NAA+PROBE: NOT DETECTED

## 2021-12-05 ENCOUNTER — TRANSCRIPTION ENCOUNTER (OUTPATIENT)
Age: 66
End: 2021-12-05

## 2021-12-06 ENCOUNTER — INPATIENT (INPATIENT)
Facility: HOSPITAL | Age: 66
LOS: 0 days | Discharge: HOME | DRG: 327 | End: 2021-12-07
Attending: SURGERY | Admitting: SURGERY
Payer: MEDICARE

## 2021-12-06 ENCOUNTER — APPOINTMENT (OUTPATIENT)
Dept: BARIATRICS | Facility: HOSPITAL | Age: 66
End: 2021-12-06

## 2021-12-06 ENCOUNTER — TRANSCRIPTION ENCOUNTER (OUTPATIENT)
Age: 66
End: 2021-12-06

## 2021-12-06 VITALS
SYSTOLIC BLOOD PRESSURE: 120 MMHG | HEART RATE: 71 BPM | HEIGHT: 61 IN | DIASTOLIC BLOOD PRESSURE: 72 MMHG | OXYGEN SATURATION: 100 % | WEIGHT: 176.81 LBS | TEMPERATURE: 97 F | RESPIRATION RATE: 18 BRPM

## 2021-12-06 DIAGNOSIS — K80.20 CALCULUS OF GALLBLADDER WITHOUT CHOLECYSTITIS WITHOUT OBSTRUCTION: ICD-10-CM

## 2021-12-06 DIAGNOSIS — K44.9 DIAPHRAGMATIC HERNIA WITHOUT OBSTRUCTION OR GANGRENE: ICD-10-CM

## 2021-12-06 DIAGNOSIS — K21.9 GASTRO-ESOPHAGEAL REFLUX DISEASE WITHOUT ESOPHAGITIS: ICD-10-CM

## 2021-12-06 DIAGNOSIS — M21.611 BUNION OF RIGHT FOOT: Chronic | ICD-10-CM

## 2021-12-06 DIAGNOSIS — S83.282A OTHER TEAR OF LATERAL MENISCUS, CURRENT INJURY, LEFT KNEE, INITIAL ENCOUNTER: Chronic | ICD-10-CM

## 2021-12-06 DIAGNOSIS — K43.2 INCISIONAL HERNIA WITHOUT OBSTRUCTION OR GANGRENE: ICD-10-CM

## 2021-12-06 DIAGNOSIS — S83.281A OTHER TEAR OF LATERAL MENISCUS, CURRENT INJURY, RIGHT KNEE, INITIAL ENCOUNTER: Chronic | ICD-10-CM

## 2021-12-06 DIAGNOSIS — Z98.890 OTHER SPECIFIED POSTPROCEDURAL STATES: Chronic | ICD-10-CM

## 2021-12-06 DIAGNOSIS — Z90.89 ACQUIRED ABSENCE OF OTHER ORGANS: Chronic | ICD-10-CM

## 2021-12-06 DIAGNOSIS — Z01.818 ENCOUNTER FOR OTHER PREPROCEDURAL EXAMINATION: ICD-10-CM

## 2021-12-06 DIAGNOSIS — M77.8 OTHER ENTHESOPATHIES, NOT ELSEWHERE CLASSIFIED: Chronic | ICD-10-CM

## 2021-12-06 DIAGNOSIS — Z98.84 BARIATRIC SURGERY STATUS: ICD-10-CM

## 2021-12-06 DIAGNOSIS — Z98.84 BARIATRIC SURGERY STATUS: Chronic | ICD-10-CM

## 2021-12-06 LAB — ABO RH CONFIRMATION: SIGNIFICANT CHANGE UP

## 2021-12-06 PROCEDURE — 49655: CPT | Mod: 80

## 2021-12-06 PROCEDURE — 43282 LAP PARAESOPH HER RPR W/MESH: CPT | Mod: 22

## 2021-12-06 PROCEDURE — 43282 LAP PARAESOPH HER RPR W/MESH: CPT | Mod: 80,22

## 2021-12-06 PROCEDURE — 49655: CPT

## 2021-12-06 RX ORDER — ACETAMINOPHEN 500 MG
1000 TABLET ORAL EVERY 6 HOURS
Refills: 0 | Status: COMPLETED | OUTPATIENT
Start: 2021-12-06 | End: 2021-12-07

## 2021-12-06 RX ORDER — SODIUM CHLORIDE 9 MG/ML
1000 INJECTION, SOLUTION INTRAVENOUS
Refills: 0 | Status: DISCONTINUED | OUTPATIENT
Start: 2021-12-06 | End: 2021-12-06

## 2021-12-06 RX ORDER — SODIUM CHLORIDE 9 MG/ML
1000 INJECTION, SOLUTION INTRAVENOUS
Refills: 0 | Status: DISCONTINUED | OUTPATIENT
Start: 2021-12-06 | End: 2021-12-07

## 2021-12-06 RX ORDER — BENZOYL PEROXIDE MICRONIZED 5.8 %
1 TOWELETTE (EA) TOPICAL
Qty: 0 | Refills: 0 | DISCHARGE

## 2021-12-06 RX ORDER — CHOLECALCIFEROL (VITAMIN D3) 125 MCG
1 CAPSULE ORAL
Qty: 0 | Refills: 0 | DISCHARGE

## 2021-12-06 RX ORDER — ONDANSETRON 8 MG/1
4 TABLET, FILM COATED ORAL EVERY 6 HOURS
Refills: 0 | Status: DISCONTINUED | OUTPATIENT
Start: 2021-12-06 | End: 2021-12-07

## 2021-12-06 RX ORDER — APREPITANT 80 MG/1
40 CAPSULE ORAL ONCE
Refills: 0 | Status: COMPLETED | OUTPATIENT
Start: 2021-12-06 | End: 2021-12-06

## 2021-12-06 RX ORDER — ENOXAPARIN SODIUM 100 MG/ML
30 INJECTION SUBCUTANEOUS ONCE
Refills: 0 | Status: COMPLETED | OUTPATIENT
Start: 2021-12-06 | End: 2021-12-06

## 2021-12-06 RX ORDER — CEFAZOLIN SODIUM 1 G
2000 VIAL (EA) INJECTION ONCE
Refills: 0 | Status: COMPLETED | OUTPATIENT
Start: 2021-12-06 | End: 2021-12-06

## 2021-12-06 RX ORDER — OMEPRAZOLE 10 MG/1
1 CAPSULE, DELAYED RELEASE ORAL
Qty: 0 | Refills: 0 | DISCHARGE

## 2021-12-06 RX ORDER — PREGABALIN 225 MG/1
1 CAPSULE ORAL
Qty: 0 | Refills: 0 | DISCHARGE

## 2021-12-06 RX ORDER — HYDROMORPHONE HYDROCHLORIDE 2 MG/ML
0.5 INJECTION INTRAMUSCULAR; INTRAVENOUS; SUBCUTANEOUS EVERY 6 HOURS
Refills: 0 | Status: DISCONTINUED | OUTPATIENT
Start: 2021-12-06 | End: 2021-12-07

## 2021-12-06 RX ORDER — HYOSCYAMINE SULFATE 0.13 MG
0.12 TABLET ORAL EVERY 6 HOURS
Refills: 0 | Status: DISCONTINUED | OUTPATIENT
Start: 2021-12-06 | End: 2021-12-07

## 2021-12-06 RX ORDER — PANTOPRAZOLE SODIUM 20 MG/1
40 TABLET, DELAYED RELEASE ORAL DAILY
Refills: 0 | Status: DISCONTINUED | OUTPATIENT
Start: 2021-12-06 | End: 2021-12-07

## 2021-12-06 RX ORDER — CHLORHEXIDINE GLUCONATE 213 G/1000ML
1 SOLUTION TOPICAL ONCE
Refills: 0 | Status: COMPLETED | OUTPATIENT
Start: 2021-12-06 | End: 2021-12-06

## 2021-12-06 RX ORDER — ACETAMINOPHEN 500 MG
1000 TABLET ORAL EVERY 6 HOURS
Refills: 0 | Status: DISCONTINUED | OUTPATIENT
Start: 2021-12-07 | End: 2021-12-07

## 2021-12-06 RX ORDER — HYDROMORPHONE HYDROCHLORIDE 2 MG/ML
0.5 INJECTION INTRAMUSCULAR; INTRAVENOUS; SUBCUTANEOUS
Refills: 0 | Status: DISCONTINUED | OUTPATIENT
Start: 2021-12-06 | End: 2021-12-06

## 2021-12-06 RX ORDER — ONDANSETRON 8 MG/1
4 TABLET, FILM COATED ORAL ONCE
Refills: 0 | Status: COMPLETED | OUTPATIENT
Start: 2021-12-06 | End: 2021-12-06

## 2021-12-06 RX ADMIN — HYDROMORPHONE HYDROCHLORIDE 0.5 MILLIGRAM(S): 2 INJECTION INTRAMUSCULAR; INTRAVENOUS; SUBCUTANEOUS at 14:20

## 2021-12-06 RX ADMIN — ONDANSETRON 4 MILLIGRAM(S): 8 TABLET, FILM COATED ORAL at 14:30

## 2021-12-06 RX ADMIN — HYDROMORPHONE HYDROCHLORIDE 0.5 MILLIGRAM(S): 2 INJECTION INTRAMUSCULAR; INTRAVENOUS; SUBCUTANEOUS at 15:48

## 2021-12-06 RX ADMIN — SODIUM CHLORIDE 150 MILLILITER(S): 9 INJECTION, SOLUTION INTRAVENOUS at 23:28

## 2021-12-06 RX ADMIN — APREPITANT 40 MILLIGRAM(S): 80 CAPSULE ORAL at 07:05

## 2021-12-06 RX ADMIN — HYDROMORPHONE HYDROCHLORIDE 0.5 MILLIGRAM(S): 2 INJECTION INTRAMUSCULAR; INTRAVENOUS; SUBCUTANEOUS at 14:45

## 2021-12-06 RX ADMIN — Medication 400 MILLIGRAM(S): at 16:58

## 2021-12-06 RX ADMIN — CHLORHEXIDINE GLUCONATE 1 APPLICATION(S): 213 SOLUTION TOPICAL at 07:06

## 2021-12-06 RX ADMIN — HYDROMORPHONE HYDROCHLORIDE 0.5 MILLIGRAM(S): 2 INJECTION INTRAMUSCULAR; INTRAVENOUS; SUBCUTANEOUS at 19:35

## 2021-12-06 RX ADMIN — Medication 400 MILLIGRAM(S): at 23:28

## 2021-12-06 RX ADMIN — SODIUM CHLORIDE 75 MILLILITER(S): 9 INJECTION, SOLUTION INTRAVENOUS at 13:12

## 2021-12-06 RX ADMIN — HYDROMORPHONE HYDROCHLORIDE 0.5 MILLIGRAM(S): 2 INJECTION INTRAMUSCULAR; INTRAVENOUS; SUBCUTANEOUS at 19:20

## 2021-12-06 RX ADMIN — SODIUM CHLORIDE 150 MILLILITER(S): 9 INJECTION, SOLUTION INTRAVENOUS at 16:58

## 2021-12-06 RX ADMIN — SODIUM CHLORIDE 150 MILLILITER(S): 9 INJECTION, SOLUTION INTRAVENOUS at 19:21

## 2021-12-06 NOTE — BRIEF OPERATIVE NOTE - NSICDXBRIEFPOSTOP_GEN_ALL_CORE_FT
POST-OP DIAGNOSIS:  Peritoneal adhesions 06-Dec-2021 13:08:41  Zulema Mack  Incisional hernia 06-Dec-2021 13:08:06  Zulema Mack  Hiatal hernia 06-Dec-2021 13:07:56  Zulema Mack

## 2021-12-06 NOTE — DISCHARGE NOTE PROVIDER - CARE PROVIDERS DIRECT ADDRESSES
[email or web address]

## 2021-12-06 NOTE — DISCHARGE NOTE PROVIDER - CARE PROVIDER_API CALL
Laxmi Moore)  Surgery  [address] [place] [address]  Phone: [phone]  Fax: [phone]  Follow Up Time:     Arash Heredia)  Surgery  [address] [place] [address]  Phone: [phone]  Fax: [phone]  Follow Up Time:

## 2021-12-06 NOTE — DISCHARGE NOTE PROVIDER - NSDCCPCAREPLAN_GEN_ALL_CORE_FT
PRINCIPAL DISCHARGE DIAGNOSIS  Diagnosis: Hiatal hernia  Assessment and Plan of Treatment:       SECONDARY DISCHARGE DIAGNOSES  Diagnosis: Incisional hernia  Assessment and Plan of Treatment:

## 2021-12-06 NOTE — PATIENT PROFILE ADULT - FALL HARM RISK - UNIVERSAL INTERVENTIONS
Bed in lowest position, wheels locked, appropriate side rails in place/Call bell, personal items and telephone in reach/Instruct patient to call for assistance before getting out of bed or chair/Non-slip footwear when patient is out of bed/[place] to call system/Physically safe environment - no spills, clutter or unnecessary equipment/Purposeful Proactive Rounding/Room/bathroom lighting operational, light cord in reach

## 2021-12-06 NOTE — DISCHARGE NOTE PROVIDER - NSDCFUSCHEDAPPT_GEN_ALL_CORE_FT
DANISHA BACON ; 12/14/2021 ; [facility] Surg Bariatric [address] ; 01/06/2022 ; NPP Surg Bariatric [address] ; 02/01/2022 ; NPP [facility] [address]

## 2021-12-06 NOTE — DISCHARGE NOTE PROVIDER - NSDCFUADDINST_GEN_ALL_CORE_FT
Advance diet as tolerated slowly. If unable to tolerate advancement, regress to previous stage and remain for 24 hours. Advance diet as tolerated slowly. If unable to tolerate advancement, regress to previous stage and remain for 24 hours.  REST! Apply ice packs to incision sites 20 mins on, 20 mins off every 4 hours for the first 24 hours.    If taking narcotic pain medications, may take COLACE (OTC stool softener) as directed to prevent constipation.    Increase ambulation and utilize incentive spirometer as directed.  May take over-the-counter acetaminophen (Tylenol) as directed for pain.

## 2021-12-06 NOTE — DISCHARGE NOTE PROVIDER - NSDCMRMEDTOKEN_GEN_ALL_CORE_FT
Calcium 500+D oral tablet, chewable: 1 tab(s) orally 2 times a day  Iron 100 Plus oral tablet: 1 tab(s) orally once a day  Multi Vitamin+:   omeprazole 20 mg oral delayed release tablet: 1 tab(s) orally once a day  Vitamin B-12 250 mcg oral tablet: 1 tab(s) orally once a day  Vitamin D3 250 mcg (10,000 intl units) oral capsule: 1 cap(s) orally once a week

## 2021-12-06 NOTE — BRIEF OPERATIVE NOTE - NSICDXBRIEFPROCEDURE_GEN_ALL_CORE_FT
PROCEDURES:  Laparoscopic repair, paraesophageal hernia 06-Dec-2021 13:04:16  Zulema Mack  Lysis of peritoneal adhesions 06-Dec-2021 13:04:26  Zulema Mack  Repair, incisional hernia, with mesh 06-Dec-2021 13:05:16  Zulema Mack

## 2021-12-06 NOTE — DISCHARGE NOTE PROVIDER - HOSPITAL COURSE
65 y/o WF with PMSx of gastric bypass, hiatal hernia admitted to [facility] for pain management and fluid hydration after undergoing laparoscopic hiatal hernia repair, lysis of adhesions, incisional hernia repair with mesh on 12/6/2021. Patient tolerated procedure well and progressed appropriately. Currently tolerating Bariatric diet, voiding independently, having flatus and ambulating. Discharged on 12/7/2021 with home medications, incentive spirometer and instructions to follow-up with Dr. PABLO Moore &  Dr. RAE Heredia in 10-14 days.

## 2021-12-06 NOTE — BRIEF OPERATIVE NOTE - NSICDXBRIEFPREOP_GEN_ALL_CORE_FT
PRE-OP DIAGNOSIS:  Hiatal hernia 06-Dec-2021 13:07:07  Zulema Mack  Incisional hernia 06-Dec-2021 13:07:51  Zulema Mack  Peritoneal adhesions 06-Dec-2021 13:07:31  Zulema Mack

## 2021-12-06 NOTE — DISCHARGE NOTE PROVIDER - NSDCCPTREATMENT_GEN_ALL_CORE_FT
PRINCIPAL PROCEDURE  Procedure: Laparoscopic repair, paraesophageal hernia  Findings and Treatment: with lysis of adhesions      SECONDARY PROCEDURE  Procedure: Repair, incisional hernia, with mesh  Findings and Treatment:

## 2021-12-07 ENCOUNTER — TRANSCRIPTION ENCOUNTER (OUTPATIENT)
Age: 66
End: 2021-12-07

## 2021-12-07 VITALS
DIASTOLIC BLOOD PRESSURE: 75 MMHG | OXYGEN SATURATION: 96 % | HEART RATE: 62 BPM | RESPIRATION RATE: 18 BRPM | TEMPERATURE: 98 F | SYSTOLIC BLOOD PRESSURE: 109 MMHG

## 2021-12-07 LAB
ANION GAP SERPL CALC-SCNC: 4 MMOL/L — LOW (ref 5–17)
BUN SERPL-MCNC: 11 MG/DL — SIGNIFICANT CHANGE UP (ref 7–23)
CALCIUM SERPL-MCNC: 8.2 MG/DL — LOW (ref 8.4–10.5)
CHLORIDE SERPL-SCNC: 104 MMOL/L — SIGNIFICANT CHANGE UP (ref 96–108)
CO2 SERPL-SCNC: 30 MMOL/L — SIGNIFICANT CHANGE UP (ref 22–31)
CREAT SERPL-MCNC: 0.58 MG/DL — SIGNIFICANT CHANGE UP (ref 0.5–1.3)
GLUCOSE SERPL-MCNC: 130 MG/DL — HIGH (ref 70–99)
HCT VFR BLD CALC: 33.3 % — LOW (ref 34.5–45)
HGB BLD-MCNC: 10.5 G/DL — LOW (ref 11.5–15.5)
MCHC RBC-ENTMCNC: 26.6 PG — LOW (ref 27–34)
MCHC RBC-ENTMCNC: 31.5 GM/DL — LOW (ref 32–36)
MCV RBC AUTO: 84.5 FL — SIGNIFICANT CHANGE UP (ref 80–100)
NRBC # BLD: 0 /100 WBCS — SIGNIFICANT CHANGE UP (ref 0–0)
PLATELET # BLD AUTO: 187 K/UL — SIGNIFICANT CHANGE UP (ref 150–400)
POTASSIUM SERPL-MCNC: 4.3 MMOL/L — SIGNIFICANT CHANGE UP (ref 3.5–5.3)
POTASSIUM SERPL-SCNC: 4.3 MMOL/L — SIGNIFICANT CHANGE UP (ref 3.5–5.3)
RBC # BLD: 3.94 M/UL — SIGNIFICANT CHANGE UP (ref 3.8–5.2)
RBC # FLD: 13.9 % — SIGNIFICANT CHANGE UP (ref 10.3–14.5)
SODIUM SERPL-SCNC: 138 MMOL/L — SIGNIFICANT CHANGE UP (ref 135–145)
WBC # BLD: 7.45 K/UL — SIGNIFICANT CHANGE UP (ref 3.8–10.5)
WBC # FLD AUTO: 7.45 K/UL — SIGNIFICANT CHANGE UP (ref 3.8–10.5)

## 2021-12-07 RX ADMIN — Medication 400 MILLIGRAM(S): at 05:03

## 2021-12-07 RX ADMIN — SODIUM CHLORIDE 150 MILLILITER(S): 9 INJECTION, SOLUTION INTRAVENOUS at 05:03

## 2021-12-07 RX ADMIN — Medication 1000 MILLIGRAM(S): at 05:36

## 2021-12-07 RX ADMIN — Medication 400 MILLIGRAM(S): at 12:45

## 2021-12-07 RX ADMIN — Medication 1000 MILLIGRAM(S): at 00:00

## 2021-12-07 NOTE — PROGRESS NOTE ADULT - ASSESSMENT
Pt. was seen this AM.  Clinically her recovery is satisfactory, she demonstrated good oral intake, her reflux is gone and we will advance diet and discharge to home as originally planned.

## 2021-12-07 NOTE — DISCHARGE NOTE NURSING/CASE MANAGEMENT/SOCIAL WORK - NSDCPEFALRISK_GEN_ALL_CORE
For information on Fall & Injury Prevention, visit: [email or web address] OR  [email or web address] OR  https://www.cdc.gov/steadi/patient.html

## 2021-12-07 NOTE — PROGRESS NOTE ADULT - SUBJECTIVE AND OBJECTIVE BOX
Surgery Note PA  65 y/o female with hx of Mary -en -Y-gastric bypass done in 1998 with hiatal hernia and acid reflux symptoms    POD # 1  Laparoscopic repair, paraesophageal hernia   Lysis of peritoneal adhesions   Repair, incisional hernia, with mesh    SUBJECTIVE:   Patient seen at bedside, no overnight events, no complaints noted and pain is controlled at this time.   Patient admits to flatus, bowel movement.   Patient denies any headaches, chest pain, shortness of breath, nausea, vomiting, fever, chills, weakness, dysuria  Patient A+Ox3 in NAD at time of visit.    OBJECTIVE:   T(C): 36.7 (12-07-21 @ 07:55), Max: 37.1 (12-06-21 @ 23:30)  HR: 62 (12-07-21 @ 07:55) (62 - 90)  BP: 109/75 (12-07-21 @ 07:55) (100/62 - 131/76)  RR: 18 (12-07-21 @ 07:55) (13 - 20)  SpO2: 96% (12-07-21 @ 07:55) (92% - 100%)  CAPILLARY BLOOD GLUCOSE    I&O's Detail    06 Dec 2021 07:01  -  07 Dec 2021 07:00  --------------------------------------------------------  IN:    IV PiggyBack: 200 mL    Lactated Ringers: 1950 mL    Lactated Ringers: 2500 mL    Oral Fluid: 200 mL  Total IN: 4850 mL    OUT:    Blood Loss (mL): 20 mL    Intermittent Catheterization - Urethral (mL): 500 mL    Voided (mL): 2200 mL  Total OUT: 2720 mL    Total NET: 2130 mL      07 Dec 2021 07:01  -  07 Dec 2021 10:21  --------------------------------------------------------  IN:  Total IN: 0 mL    OUT:    Voided (mL): 300 mL  Total OUT: 300 mL    Total NET: -300 mL      Physical exam:  General: A+O x 3 in NAD  Chest: Clear through auscultation bilaterally, No rales, rhonchi wheezes noted bilaterally  Heart: S1,S1 RRR, no murmurs noted  Abdomen: soft non distended, non tender, non tympanic, BS x 4  Incisions: 8 sites intact, no erythema noted  Extremities: no edema noted, warm,  no calf tenderness     MEDICATIONS  (STANDING):  lactated ringers. 1000 milliLiter(s) (150 mL/Hr) IV Continuous <Continuous>  pantoprazole  Injectable 40 milliGRAM(s) IV Push daily    MEDICATIONS  (PRN):  acetaminophen   IVPB .. 1000 milliGRAM(s) IV Intermittent every 6 hours PRN Mild Pain (1 - 3)  HYDROmorphone  Injectable 0.5 milliGRAM(s) IV Push every 6 hours PRN Severe Pain (7 - 10)  hyoscyamine SL 0.125 milliGRAM(s) SubLingual every 6 hours PRN nausea/vomiting  ondansetron Injectable 4 milliGRAM(s) IV Push every 6 hours PRN Nausea and/or Vomiting      LABS:                        10.5   7.45  )-----------( 187      ( 07 Dec 2021 06:31 )             33.3     12-07    138  |  104  |  11  ----------------------------<  130<H>  4.3   |  30  |  0.58    Ca    8.2<L>      07 Dec 2021 06:31        Assessment/Plan  Patient is a 66y old  Female who presents with a chief complaint of hiatal hernia POD #1 repair of Paraesophageal hernia     Patient cleared for discharge today  Follow post op instructions as printed on discharge sheet

## 2021-12-07 NOTE — DISCHARGE NOTE NURSING/CASE MANAGEMENT/SOCIAL WORK - PATIENT PORTAL LINK FT
You can access the FollowMyHealth Patient Portal offered by [facility] by registering at the following website: [email or web address]. By joining [employer]’s FollowMyHealth portal, you will also be able to view your health information using other applications (apps) compatible with our system.

## 2021-12-14 ENCOUNTER — APPOINTMENT (OUTPATIENT)
Dept: BARIATRICS | Facility: CLINIC | Age: 66
End: 2021-12-14
Payer: MEDICARE

## 2021-12-14 VITALS
SYSTOLIC BLOOD PRESSURE: 110 MMHG | HEIGHT: 63 IN | OXYGEN SATURATION: 98 % | HEART RATE: 84 BPM | TEMPERATURE: 97.2 F | BODY MASS INDEX: 31.68 KG/M2 | WEIGHT: 178.79 LBS | DIASTOLIC BLOOD PRESSURE: 72 MMHG

## 2021-12-14 DIAGNOSIS — K44.9 DIAPHRAGMATIC HERNIA W/OUT OBSTRUCTION OR GANGRENE: ICD-10-CM

## 2021-12-14 PROCEDURE — 99024 POSTOP FOLLOW-UP VISIT: CPT

## 2021-12-14 RX ORDER — FAMOTIDINE 40 MG/1
40 TABLET, FILM COATED ORAL
Qty: 30 | Refills: 2 | Status: DISCONTINUED | COMMUNITY
Start: 2021-09-30 | End: 2021-12-14

## 2021-12-14 RX ORDER — OMEPRAZOLE 20 MG/1
20 CAPSULE, DELAYED RELEASE ORAL DAILY
Qty: 30 | Refills: 1 | Status: DISCONTINUED | COMMUNITY
Start: 2021-11-30 | End: 2021-12-14

## 2021-12-14 RX ORDER — OXYCODONE 5 MG/1
5 TABLET ORAL
Qty: 6 | Refills: 0 | Status: DISCONTINUED | COMMUNITY
Start: 2021-11-30 | End: 2021-12-14

## 2021-12-14 RX ORDER — OXYCODONE HYDROCHLORIDE 5 MG/1
5 CAPSULE ORAL
Qty: 10 | Refills: 0 | Status: DISCONTINUED | COMMUNITY
Start: 2021-12-08 | End: 2021-12-14

## 2021-12-30 PROCEDURE — C1889: CPT

## 2021-12-30 PROCEDURE — 85027 COMPLETE CBC AUTOMATED: CPT

## 2021-12-30 PROCEDURE — 80048 BASIC METABOLIC PNL TOTAL CA: CPT

## 2021-12-30 PROCEDURE — C1781: CPT

## 2021-12-30 PROCEDURE — 36415 COLL VENOUS BLD VENIPUNCTURE: CPT

## 2021-12-30 PROCEDURE — 94664 DEMO&/EVAL PT USE INHALER: CPT

## 2021-12-30 NOTE — HISTORY OF PRESENT ILLNESS
[Procedure: ___] : Procedure performed: [unfilled]  [Date of Surgery: ___] : Date of Surgery:   [unfilled] [Surgeon Name:   ___] : Surgeon Name: Dr. TORRES [de-identified] : 67 yo F s/p open gastric bypass in 1998 with Dr. Heredia now 1 week s/p hiatal hernia repair and incisional hernia repair with mesh. No longer waking up in middle of night with acid reflux symptoms. Continues to take omeprazole 20 mg once daily.  Tolerating soft foods and is drinking adequate water daily. Complains of constipation that resolved with Colace. No shortness of breath, calf pain, nausea, vomiting and diarrhea. Ambulating frequently for exercise.  [de-identified] : gastric bypass , Date of Surgery: 1998, Surgeon Name: Dr. Heredia . Pre-op weight was 210 lbs. \par

## 2021-12-30 NOTE — ASSESSMENT
[FreeTextEntry1] : 67 yo F s/p open gastric bypass in 1998 with Dr. Heredia now 1 week s/p hiatal hernia repair and incisional hernia repair with mesh. Doing well. Incisions are healing appropriately. \par \par Advance diet as tolerated to regular low fat, low carbohydrate and high protein diet.  \par Increase ambulation and not to do any heavy lifting until 6 weeks post op\par Follow up in 3-4 weeks\par Call with any questions or concerns

## 2021-12-30 NOTE — REVIEW OF SYSTEMS
[Constipation] : constipation [Negative] : Endocrine [Fever] : no fever [Chills] : no chills [Night Sweats] : no night sweats [Fatigue] : no fatigue [Recent Change In Weight] : ~T no recent weight change [Eye Pain] : no eye pain [Red Eyes] : eyes not red [Vision Problems] : no vision problems [Dysphagia] : no dysphagia [Hoarseness] : no hoarseness [Odynophagia] : no odynophagia [Abdominal Pain] : no abdominal pain [Vomiting] : no vomiting [Diarrhea] : no diarrhea [Reflux/Heartburn] : no reflux/ heartburn

## 2022-01-03 RX ORDER — FAMOTIDINE 40 MG/1
40 TABLET, FILM COATED ORAL
Qty: 30 | Refills: 2 | Status: ACTIVE | COMMUNITY
Start: 2022-01-03 | End: 1900-01-01

## 2022-01-06 ENCOUNTER — APPOINTMENT (OUTPATIENT)
Dept: SURGERY | Facility: CLINIC | Age: 67
End: 2022-01-06
Payer: MEDICARE

## 2022-01-06 ENCOUNTER — APPOINTMENT (OUTPATIENT)
Dept: BARIATRICS | Facility: CLINIC | Age: 67
End: 2022-01-06
Payer: MEDICARE

## 2022-01-06 VITALS
BODY MASS INDEX: 30.83 KG/M2 | WEIGHT: 174 LBS | DIASTOLIC BLOOD PRESSURE: 90 MMHG | SYSTOLIC BLOOD PRESSURE: 138 MMHG | HEART RATE: 76 BPM | OXYGEN SATURATION: 97 % | HEIGHT: 63 IN

## 2022-01-06 VITALS
HEART RATE: 73 BPM | TEMPERATURE: 96.6 F | BODY MASS INDEX: 30.94 KG/M2 | SYSTOLIC BLOOD PRESSURE: 120 MMHG | OXYGEN SATURATION: 97 % | HEIGHT: 63 IN | WEIGHT: 174.6 LBS | DIASTOLIC BLOOD PRESSURE: 80 MMHG

## 2022-01-06 DIAGNOSIS — E66.9 OBESITY, UNSPECIFIED: ICD-10-CM

## 2022-01-06 DIAGNOSIS — M85.80 OTHER SPECIFIED DISORDERS OF BONE DENSITY AND STRUCTURE, UNSPECIFIED SITE: ICD-10-CM

## 2022-01-06 PROCEDURE — 99024 POSTOP FOLLOW-UP VISIT: CPT

## 2022-01-06 NOTE — REASON FOR VISIT
[Post Operative Visit] : a post operative visit for [S/P Bariatric Surgery] : s/p bariatric surgery [Other___] : [unfilled] [Spouse] : spouse

## 2022-01-10 NOTE — PHYSICAL EXAM
[Obese] : obese [Normal] : affect appropriate [de-identified] : EOMI, anicteric  [de-identified] : nodular area to the right of the midline no evidence of hernia

## 2022-01-10 NOTE — HISTORY OF PRESENT ILLNESS
[Procedure: ___] : Procedure performed: [unfilled]  [Date of Surgery: ___] : Date of Surgery:   [unfilled] [Surgeon Name:   ___] : Surgeon Name: Dr. TORRES [de-identified] : 67 yo F s/p open gastric bypass in 1998 with Dr. Heredia 1 month s/p hiatal hernia repair and incisional hernia repair with mesh. Occasional reflux symptoms- continues taking Pepcid as needed. Tolerating soft and solid  foods without any issues  and is drinking adequate water daily. No constipation or diarrhea. No shortness of breath, calf pain, nausea, vomiting and diarrhea. Ambulating frequently for exercise. Recent labs performed on 9/30/21 . [de-identified] : gastric bypass , Date of Surgery: 1998, Surgeon Name: Dr. Heredia . Pre-op weight was 210 lbs. \par

## 2022-01-10 NOTE — REVIEW OF SYSTEMS
[Recent Change In Weight] : ~T recent weight change [Constipation] : constipation [Negative] : Heme/Lymph [Fever] : no fever [Chills] : no chills [Night Sweats] : no night sweats [Fatigue] : no fatigue [Eye Pain] : no eye pain [Red Eyes] : eyes not red [Vision Problems] : no vision problems [Dysphagia] : no dysphagia [Hoarseness] : no hoarseness [Odynophagia] : no odynophagia [Abdominal Pain] : no abdominal pain [Vomiting] : no vomiting [Diarrhea] : no diarrhea [Reflux/Heartburn] : no reflux/ heartburn [FreeTextEntry2] : weight loss

## 2022-01-10 NOTE — ASSESSMENT
[FreeTextEntry1] : 65 yo F s/p open gastric bypass in 1998 with Dr. Heredia now 1 month s/p hiatal hernia repair and incisional hernia repair with mesh here for a follow up visit. Doing well. \par \par Advance diet as tolerated to regular low fat, low carbohydrate and high protein diet.  \par Increase ambulation\par Plan to follow up with Dr. Heredia at 1 pm today. \par Discussed and reviewed with patient foods to avoid that exacerbate GERD symptoms. Recommended that pt take an OTC H2 blocker  as needed for symptoms. \par Follow up in 8 weeks\par Call with any questions or concerns

## 2022-02-01 ENCOUNTER — APPOINTMENT (OUTPATIENT)
Dept: BARIATRICS/WEIGHT MGMT | Facility: CLINIC | Age: 67
End: 2022-02-01

## 2022-03-08 ENCOUNTER — APPOINTMENT (OUTPATIENT)
Dept: BARIATRICS | Facility: CLINIC | Age: 67
End: 2022-03-08
Payer: MEDICARE

## 2022-03-08 VITALS — HEIGHT: 63 IN | BODY MASS INDEX: 30.12 KG/M2 | WEIGHT: 170 LBS

## 2022-03-08 DIAGNOSIS — Z87.19 OTHER SPECIFIED POSTPROCEDURAL STATES: ICD-10-CM

## 2022-03-08 DIAGNOSIS — Z98.890 OTHER SPECIFIED POSTPROCEDURAL STATES: ICD-10-CM

## 2022-03-08 DIAGNOSIS — R11.0 NAUSEA: ICD-10-CM

## 2022-03-08 PROCEDURE — 99212 OFFICE O/P EST SF 10 MIN: CPT | Mod: 95

## 2022-03-08 NOTE — REASON FOR VISIT
[Home] : at home, [unfilled] , at the time of the visit. [Medical Office: ([facility])___] : at the medical office located in  [Verbal consent obtained from patient] : the patient, [unfilled] [Follow-Up Visit] : a follow-up visit for [Other___] : [unfilled]

## 2022-03-10 NOTE — ASSESSMENT
[FreeTextEntry1] : 67 year old F is 3 mos s/p hiatal hernia and incisional hernia repair with mesh placement 12/6/2021 by Dr. Moore and Dr. Heredia. Doing well.\par \par Encouraged pt to keep log of nausea/dry heaving occurrences with correlation to food types/quantity/timing\par Omeprazole daily to see if symptoms are alleviated before next visit\par No labs needed\par Continue current diet but increase water intake\par Advised to increase daily steps through current modalities \par \par Return to office in 3mos\par All answers were answered\par Pt to followup sooner should she have symptoms of gallstones\par \par Pt's case discussed with Dr. Moore\par \par Additional time spent before and after visit reviewing chart

## 2022-03-10 NOTE — HISTORY OF PRESENT ILLNESS
[Procedure: ___] : Procedure performed: [unfilled]  [Date of Surgery: ___] : Date of Surgery:   [unfilled] [Surgeon Name:   ___] : Surgeon Name: Dr. TORRES [Pre-Op Weight ___] : Pre-op weight was [unfilled] lbs [___ Months Post Op] : [unfilled] months [de-identified] : 67 year old F is 3 mos s/p hiatal hernia and incisional hernia repair with mesh placement 12/6/2021 by Dr. Moore and Dr. Heredia. Very happy with recovery progress, feels great. Current wt 170 lbs, loss 4 lbs since last visit 1/6/2022 (preop wt 210 lbs). Pt is consuming an adequate amount of protein daily. Water intake 32oz per day. No reflux symptoms,abd pain or fever/chills/sweats, nor shoulder pain. Tolerating all solid foods and liquids without any issues. However, pt reports occasional nausea and dry heaves that pt is unable to pinpoint provoking causes. Pt states not consistent with taking omeprazole , does not need new prescription. Consuming 3 meals/day. No constipation or diarrhea, at least 1-2 BM (formed stools) per day. Pt is exercising, ~5k steps/day, recently got a [employer] exercise program occasionally. Pt is currently sleeping ~ 7-8 hours/night.

## 2022-03-10 NOTE — PHYSICAL EXAM
[Normal] : affect appropriate [de-identified] : pt examined visually through TEB; abd appear non-distended and incisions C/D/I with wound edges well approximated and healing, no drainage or bleeding

## 2022-04-27 ENCOUNTER — NON-APPOINTMENT (OUTPATIENT)
Age: 67
End: 2022-04-27

## 2022-10-26 NOTE — H&P PST ADULT - PSYCHIATRIC
Vomiting is very common in children. Vomiting causes food and liquid to come up from the stomach and out of the mouth or nose. Vomiting can cause your child to lose too much fluid and salt from his body. This is called dehydration. Dehydration can be a dangerous condition for your child. When a child is dehydrated, his body and organs such as the heart may not work normally. You can help prevent your child from becoming dehydrated by giving him enough liquids to replace vomited fluid. It is important to call your child's caregiver if you think your child is becoming dehydrated.  There are many causes of vomiting. A common cause in children over one year old is gastroenteritis, or the "stomach flu". The stomach flu is caused by germs that infect the lining of the stomach and intestines. Other causes of vomiting are problems with the muscles surrounding your baby's stomach. These problems may be called pyloric stenosis or gastroesophageal reflux disease (GERD). Your child may also have vomiting because of food poisoning, infections in other body organs, or a head injury. Sometimes, the cause of your child's vomiting is unknown.  Picture of the digestive system of a child  AFTER YOU LEAVE:  Medicines:  Keep a current list of your child's medicines: Include the amounts, and when, how, and why they are taken. Bring the list and the medicines in their containers to follow-up visits. Carry your child's medicine list with you in case of an emergency. Throw away old medicine lists. Give vitamins, herbs, or food supplements only as directed.  Give your child's medicine as directed: Call your child's primary healthcare provider if you think the medicine is not working as expected. Tell him if your child is allergic to any medicine. Ask before you change or stop giving your child his medicines.  Do not give your child any over-the-counter (OTC) medicines for his vomiting unless his caregiver tells you to. If you are told to give your child a medicine, follow the caregiver's instructions carefully.  How can I take care of my child at home?  Help your child to rest until he feels better.  Call your child's caregiver if your child shows signs of dehydration.  A baby may be dehydrated if he wets five or less diapers during a 24 hour time period. A dehydrated baby may have a dry mouth and cracked lips, and may cry with few or no tears. A baby with worsening dehydration may act sleepier, weaker, or fussier than usual. The baby's eyes and soft spot on top of his head may be sunken if he is dehydrated. He may also have wrinkled skin, and pale hands and feet.  A child may be dehydrated if he has a dry mouth, cracked lips, cries without tears, or is dizzy. A dehydrated child may be sleepier, fussier, and weaker than usual. He may be very thirsty and will urinate less often than usual.  Give your child plenty of liquids.  The best way to prevent dehydration is to give your child plenty of fluids, even if he is still occasionally vomiting. The best fluids to give your child contain a mixture of salt, sugar, minerals, and nutrients in water. These are called oral rehydration solutions (ORS). Many brands are available at grocerMST stores. Ask your child's caregiver which brand you should buy.  Give your baby 1 to 2 teaspoons of ORS every five minutes. Older children can begin with small sips of ORS often. Use a spoon, syringe, cup, or bottle to feed ORS to your child. If your child does not vomit the ORS, slowly give your child more ORS. Encourage but do not force your child to drink.  Continue giving your baby formula or breast milk throughout his illness, or follow his caregiver's instructions. Your child can start eating foods when he is ready. Start slowly with bland food such as cooked cereal, rice, noodles, bananas, crackers, applesauce, or toast. If he does not have problems with soft, bland foods, slowly begin to serve him regular foods.  Put your baby or young child on his stomach or side whenever he is lying down. This may stop him from breathing vomit into his airways and lungs.  Save your extra breast milk. If you are breast feeding your child, keep offering him breast milk. If your child is drinking less than usual, pump your breasts after feedings. Store the extra milk in the freezer so that your child can drink it later. Ask your child's caregiver for information about pumping, storing, and freezing your breast milk.  Wash your and your child's hands often with soap and warm water. Handwashing may help you and your child to prevent spreading germs to others. Wash your hands after changing diapers and before fixing food. Your child and all family members should wash their hands before touching food and eating. Everyone should wash their hands after going to the bathroom.    Diarrhea    Diarrhea is frequent loose or watery bowel movements that has many causes. Diarrhea can make you feel weak and cause you to become dehydrated.. Drink clear fluids to prevent dehydration. Eat bland, easy-to-digest foods as tolerated. monitor the amount of wet diapers or voids to ensure you or patient are well hydrated. If diarrhea persists more then 5 days follow up with pmd for possible stool cultures.    SEEK IMMEDIATE MEDICAL CARE IF YOU HAVE ANY OF THE FOLLOWING SYMPTOMS: high fevers, lightheadedness/dizziness, chest pain, black or bloody stools, shortness of breath, severe abdominal or back pain, or any signs of dehydration. detailed exam

## 2023-02-21 NOTE — PRE-OP CHECKLIST - IV STARTED
yes Cheiloplasty (Complex) Text: A decision was made to reconstruct the defect with a  cheiloplasty.  The defect was undermined extensively.  Additional orbicularis oris muscle was excised with a 15 blade scalpel.  The defect was converted into a full thickness wedge to facilite a better cosmetic result.  Small vessels were then tied off with 5-0 monocyrl. The orbicularis oris, superficial fascia, adipose and dermis were then reapproximated.  After the deeper layers were approximated the epidermis was reapproximated with particular care given to realign the vermilion border.

## 2023-04-26 ENCOUNTER — APPOINTMENT (OUTPATIENT)
Dept: OTOLARYNGOLOGY | Facility: CLINIC | Age: 68
End: 2023-04-26
Payer: MEDICARE

## 2023-04-26 VITALS — BODY MASS INDEX: 30.12 KG/M2 | HEIGHT: 63 IN | WEIGHT: 170 LBS

## 2023-04-26 DIAGNOSIS — H90.3 SENSORINEURAL HEARING LOSS, BILATERAL: ICD-10-CM

## 2023-04-26 DIAGNOSIS — K21.9 GASTRO-ESOPHAGEAL REFLUX DISEASE W/OUT ESOPHAGITIS: ICD-10-CM

## 2023-04-26 DIAGNOSIS — H61.303 ACQUIRED STENOSIS OF EXTERNAL EAR CANAL, UNSPECIFIED, BILATERAL: ICD-10-CM

## 2023-04-26 DIAGNOSIS — M26.621 ARTHRALGIA OF RIGHT TEMPOROMANDIBULAR JOINT: ICD-10-CM

## 2023-04-26 PROCEDURE — 99213 OFFICE O/P EST LOW 20 MIN: CPT | Mod: 25

## 2023-04-26 PROCEDURE — 92567 TYMPANOMETRY: CPT

## 2023-04-26 PROCEDURE — 31575 DIAGNOSTIC LARYNGOSCOPY: CPT

## 2023-04-26 PROCEDURE — 92557 COMPREHENSIVE HEARING TEST: CPT

## 2023-04-26 NOTE — PHYSICAL EXAM
[Normal] : mucosa is normal [Midline] : trachea located in midline position [FreeTextEntry1] : Procedure: Microscopic Ear Exam\par \par Left ear: Exostosis causing 70% stenosis.   No inflammation.  \par Tympanic membrane intact without inflammation.\par \par Right ear:  Exostosis causing 70% stenosis.   No inflammation.  \par Tympanic membrane intact without inflammation. [de-identified] : Enlarged

## 2023-04-26 NOTE — PROCEDURE
[de-identified] : PROCEDURE: FLEXIBLE LARYNGOSCOPY ENDOSCOPY  \par \par Surgeon: Dr. Mcdaniels\par Indication: Chronic laryngitis\par Anesthetic: Topical lidocaine and Afrin\par Procedure: The patient was placed in a sitting position.  Following application of the topical anesthetic and decongestant, exam was performed with a flexible.  The scope was passed along the nasal floor to the nasopharynx and angled down to the hypo pharynx and larynx.    The following findings were noted:\par \par Nasal mucosa:   No edema\par   \par Nasopharynx: no masses, choanae patent, no adenoid tissue\par \par Base of tongue/vallecula: no masses or asymmetry\par HypoPharyngeal walls: symmetrical. No masses\par Pyriform sinuses: no lesions or pooling of secretions\par Epiglottis: normal. No edema or lesions\par Aryepiglottic folds: normal. No lesions. \par Larynx Vocal cords: clear and mobile. No lesions. Airway patent\par Arytenoids: Erythema bilateral.\par Interarytenoid area: Erythema bilaterally without edema or mucus pooling.

## 2023-04-26 NOTE — CONSULT LETTER
[Please see my note below.] : Please see my note below. [FreeTextEntry2] : Dear CHRIS SANTANA  [FreeTextEntry1] : Thank you for allowing me to participate in the care of DANISHA BACON .\par Please see the attached visit note.\par \par \par \par Harley Mcdaniels\par Otology\par Medical Director of [facility]\par Department of Otolaryngology\par [facility]

## 2023-04-26 NOTE — DATA REVIEWED
[de-identified] : In light of the patients current symptoms, Complete audiometry was ordered and completed today. I have interpreted these results and reviewed them in detail with the patient.\par Moderate mid frequency hearing loss noted bilaterally.  Today's testing is compared to prior testing\par \par

## 2023-04-26 NOTE — ASSESSMENT
[FreeTextEntry1] : Right ear pain of uncertain etiology.  Recent dental issues suggests the possibility of referred TMJ arthralgia.  Other etiologies are possible.  I have discussed this with her in detail.\par \par I have recommended behavioral modification for evidence of GERD.\par \par I have recommended discontinuing oral and topical antibiotics\par \par I have conservative measures for TMJ management including soft diet and NSAIDs.  If symptoms persist, I have suggested CT scan of the temporal bone.\par \par The option of hearing amplification for hearing loss discussed.  Annual reevaluation for hearing testing advised.\par \par

## 2023-04-27 PROBLEM — H90.3 SENSORINEURAL HEARING LOSS (SNHL) OF BOTH EARS: Status: ACTIVE | Noted: 2020-06-09

## 2023-08-14 NOTE — HISTORY OF PRESENT ILLNESS
Refilled. Due for virtual visit  in October.  Ashanti BERGER, CNP     [de-identified] : DANISHA BACON has a history of right intermittent ear pain for about 6 months. Patient reports of no significant changes in hearing. Prior history of bony growth in the right ear (exostosis). No otorrhea. No perceived hearing loss. Started on oral and topical antibiotics for 5 days with no relief of ear pain. \par